# Patient Record
Sex: FEMALE | Race: BLACK OR AFRICAN AMERICAN | Employment: UNEMPLOYED | URBAN - METROPOLITAN AREA
[De-identification: names, ages, dates, MRNs, and addresses within clinical notes are randomized per-mention and may not be internally consistent; named-entity substitution may affect disease eponyms.]

---

## 2018-02-11 ENCOUNTER — HOSPITAL ENCOUNTER (EMERGENCY)
Age: 58
Discharge: HOME | End: 2018-02-11
Attending: EMERGENCY MEDICINE
Payer: MEDICARE

## 2018-02-11 VITALS
HEART RATE: 62 BPM | WEIGHT: 214 LBS | HEIGHT: 71 IN | RESPIRATION RATE: 18 BRPM | SYSTOLIC BLOOD PRESSURE: 180 MMHG | OXYGEN SATURATION: 99 % | DIASTOLIC BLOOD PRESSURE: 84 MMHG | BODY MASS INDEX: 29.96 KG/M2

## 2018-02-11 DIAGNOSIS — I10 ESSENTIAL HYPERTENSION: Primary | ICD-10-CM

## 2018-02-11 PROCEDURE — 99282 EMERGENCY DEPT VISIT SF MDM: CPT

## 2018-02-11 RX ORDER — LOSARTAN POTASSIUM 100 MG/1
100 TABLET ORAL DAILY
COMMUNITY

## 2018-02-11 RX ORDER — FUROSEMIDE 40 MG/1
TABLET ORAL DAILY
COMMUNITY

## 2018-02-11 RX ORDER — ATORVASTATIN CALCIUM 40 MG/1
TABLET, FILM COATED ORAL DAILY
COMMUNITY

## 2018-02-11 RX ORDER — FAMOTIDINE 20 MG/1
20 TABLET, FILM COATED ORAL 2 TIMES DAILY
COMMUNITY

## 2018-02-11 RX ORDER — POTASSIUM CHLORIDE 1.5 G/1.77G
20 POWDER, FOR SOLUTION ORAL 2 TIMES DAILY
COMMUNITY

## 2018-02-11 RX ORDER — LABETALOL 100 MG/1
TABLET, FILM COATED ORAL 2 TIMES DAILY
COMMUNITY

## 2018-02-11 NOTE — ED TRIAGE NOTES
Patient arrives with c/o elevated BP that was taken home. Patient with hx of stroke. No new onset blurry vision, numbness/tingling, HA. Sepsis Screening completed    (  )Patient meets SIRS criteria. ( x )Patient does not meet SIRS criteria.       SIRS Criteria is achieved when two or more of the following are present   Temperature < 96.8°F (36°C) or > 100.9°F (38.3°C)   Heart Rate > 90 beats per minute   Respiratory Rate > 20 breaths per minute   WBC count > 12,000 or <4,000 or > 10% bands

## 2018-02-12 NOTE — DISCHARGE INSTRUCTIONS
High Blood Pressure: Care Instructions  Your Care Instructions    If your blood pressure is usually above 140/90, you have high blood pressure, or hypertension. That means the top number is 140 or higher or the bottom number is 90 or higher, or both. Despite what a lot of people think, high blood pressure usually doesn't cause headaches or make you feel dizzy or lightheaded. It usually has no symptoms. But it does increase your risk for heart attack, stroke, and kidney or eye damage. The higher your blood pressure, the more your risk increases. Your doctor will give you a goal for your blood pressure. Your goal will be based on your health and your age. An example of a goal is to keep your blood pressure below 140/90. Lifestyle changes, such as eating healthy and being active, are always important to help lower blood pressure. You might also take medicine to reach your blood pressure goal.  Follow-up care is a key part of your treatment and safety. Be sure to make and go to all appointments, and call your doctor if you are having problems. It's also a good idea to know your test results and keep a list of the medicines you take. How can you care for yourself at home? Medical treatment  · If you stop taking your medicine, your blood pressure will go back up. You may take one or more types of medicine to lower your blood pressure. Be safe with medicines. Take your medicine exactly as prescribed. Call your doctor if you think you are having a problem with your medicine. · Talk to your doctor before you start taking aspirin every day. Aspirin can help certain people lower their risk of a heart attack or stroke. But taking aspirin isn't right for everyone, because it can cause serious bleeding. · See your doctor regularly. You may need to see the doctor more often at first or until your blood pressure comes down.   · If you are taking blood pressure medicine, talk to your doctor before you take decongestants or anti-inflammatory medicine, such as ibuprofen. Some of these medicines can raise blood pressure. · Learn how to check your blood pressure at home. Lifestyle changes  · Stay at a healthy weight. This is especially important if you put on weight around the waist. Losing even 10 pounds can help you lower your blood pressure. · If your doctor recommends it, get more exercise. Walking is a good choice. Bit by bit, increase the amount you walk every day. Try for at least 30 minutes on most days of the week. You also may want to swim, bike, or do other activities. · Avoid or limit alcohol. Talk to your doctor about whether you can drink any alcohol. · Try to limit how much sodium you eat to less than 2,300 milligrams (mg) a day. Your doctor may ask you to try to eat less than 1,500 mg a day. · Eat plenty of fruits (such as bananas and oranges), vegetables, legumes, whole grains, and low-fat dairy products. · Lower the amount of saturated fat in your diet. Saturated fat is found in animal products such as milk, cheese, and meat. Limiting these foods may help you lose weight and also lower your risk for heart disease. · Do not smoke. Smoking increases your risk for heart attack and stroke. If you need help quitting, talk to your doctor about stop-smoking programs and medicines. These can increase your chances of quitting for good. When should you call for help? Call 911 anytime you think you may need emergency care. This may mean having symptoms that suggest that your blood pressure is causing a serious heart or blood vessel problem. Your blood pressure may be over 180/110. ? For example, call 911 if:  ? · You have symptoms of a heart attack. These may include:  ¨ Chest pain or pressure, or a strange feeling in the chest.  ¨ Sweating. ¨ Shortness of breath. ¨ Nausea or vomiting.   ¨ Pain, pressure, or a strange feeling in the back, neck, jaw, or upper belly or in one or both shoulders or arms.  ¨ Lightheadedness or sudden weakness. ¨ A fast or irregular heartbeat. ? · You have symptoms of a stroke. These may include:  ¨ Sudden numbness, tingling, weakness, or loss of movement in your face, arm, or leg, especially on only one side of your body. ¨ Sudden vision changes. ¨ Sudden trouble speaking. ¨ Sudden confusion or trouble understanding simple statements. ¨ Sudden problems with walking or balance. ¨ A sudden, severe headache that is different from past headaches. ? · You have severe back or belly pain. ?Do not wait until your blood pressure comes down on its own. Get help right away. ?Call your doctor now or seek immediate care if:  ? · Your blood pressure is much higher than normal (such as 180/110 or higher), but you don't have symptoms. ? · You think high blood pressure is causing symptoms, such as:  ¨ Severe headache. ¨ Blurry vision. ? Watch closely for changes in your health, and be sure to contact your doctor if:  ? · Your blood pressure measures 140/90 or higher at least 2 times. That means the top number is 140 or higher or the bottom number is 90 or higher, or both. ? · You think you may be having side effects from your blood pressure medicine. ? · Your blood pressure is usually normal, but it goes above normal at least 2 times. Where can you learn more? Go to http://linda-stefanie.info/. Enter J475 in the search box to learn more about \"High Blood Pressure: Care Instructions. \"  Current as of: September 21, 2016  Content Version: 11.4  © 7494-7133 [employer]. Care instructions adapted under license by [employer] (which disclaims liability or warranty for this information). If you have questions about a medical condition or this instruction, always ask your healthcare professional. [place] [address] any warranty or liability for your use of this information.

## 2018-02-12 NOTE — ED PROVIDER NOTES
EMERGENCY DEPARTMENT HISTORY AND PHYSICAL EXAM    Date: 2/11/2018  Patient Name: Lou Hadley    History of Presenting Illness     Chief Complaint   Patient presents with    Hypertension         History Provided By: Patient and Patient's Daughter    Chief Complaint: Elevated BP  Duration: 3 days  Timing:  Waxing and Waning  Severity: 831'E systolic at home  Associated Symptoms: denies any other associated signs or symptoms    Additional History (Context):   9:05 PM  Lou Hadley is a 62 y.o. 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 Associated sxs vision change which she attributes to sodium in food. Pt had a CVA 12/29/17 and has been home for 3 weeks. Pt reports that she has felt fine since coming home and notes no sxs. Family member reports that visiting nurses were concerned that BP has been high. Denies any hx of DM. Pt denies HA, pain, CP, SOB, abdominal pain, N/V/D/C, urinary sxs, leg swelling or pain, changes in numbness, tobacco use, or any other sxs or complaints. PCP: Estephania Whittaker MD    Current Outpatient Prescriptions   Medication Sig Dispense Refill    atorvastatin (LIPITOR) 40 mg tablet Take  by mouth daily.  famotidine (PEPCID) 20 mg tablet Take 20 mg by mouth two (2) times a day.  furosemide (LASIX) 40 mg tablet Take  by mouth daily.  labetalol (NORMODYNE) 100 mg tablet Take  by mouth two (2) times a day.  losartan (COZAAR) 100 mg tablet Take 100 mg by mouth daily.  potassium chloride (KLOR-CON) 20 mEq packet Take 20 mEq by mouth two (2) times a day.  vortioxetine (TRINTELLIX) 10 mg tablet Take 10 mg by mouth daily.          Past History     Past Medical History:  Past Medical History:   Diagnosis Date    Cancer [facility] eye ([facility] [address]     Hypertension     Stroke [facility])        Past Surgical History:  Past Surgical History: Procedure Laterality Date    ABDOMEN SURGERY PROC UNLISTED      d&c    CARDIOVERSION, ELECTIVE  2016    HX GYN          HX HEENT      cancer    INGRID ECHO COLOR FLOW  2016       Family History:  History reviewed. No pertinent family history. Social History:  Social History   Substance Use Topics    Smoking status: Never Smoker    Smokeless tobacco: Never Used    Alcohol use No       Allergies:  No Known Allergies      Review of Systems   Review of Systems   Constitutional: Negative for chills and fever. HENT: Negative for ear pain and hearing loss. Eyes: Positive for visual disturbance (attributes to sodium in food). Negative for pain. Respiratory: Negative for cough, chest tightness and shortness of breath. Cardiovascular: Negative for chest pain and leg swelling. Gastrointestinal: Negative for abdominal pain, constipation, diarrhea, nausea and vomiting. Genitourinary: Negative for difficulty urinating and dysuria. Musculoskeletal: Negative for arthralgias, back pain, myalgias and neck pain. Neurological: Negative for dizziness, syncope, light-headedness, numbness and headaches. All other systems reviewed and are negative. Physical Exam     Vitals:    18 1817 18 2153   BP: (!) 177/101 180/84   Pulse: 81 62   Resp: 18    SpO2: 98% 99%   Weight: 97.1 kg (214 lb)    Height: 5' 11\" (1.803 m)      Physical Exam   Constitutional: She is oriented to person, place, and time. She appears well-developed. HENT:   Head: Normocephalic and atraumatic. Eyes: Left pupil is round and reactive. Pt is blind in the right eye. Neck: Normal range of motion. Cardiovascular: Normal rate and regular rhythm. Pulmonary/Chest: Effort normal and breath sounds normal. No stridor. She has no decreased breath sounds. She has no wheezes. She has no rhonchi. She has no rales. Abdominal: Soft. She exhibits no distension. There is no tenderness.    Neurological: She is alert and oriented to person, place, and time. No cranial nerve deficit. 4/5 strength of UE right, 5/5 left upper extremity,  pt states that this is her baseline with hx of CVA. Cranial nerves 2-12 grossly intact. Psychiatric: She has a normal mood and affect. Nursing note and vitals reviewed. Diagnostic Study Results     Labs -   No results found for this or any previous visit (from the past 12 hour(s)). Radiologic Studies -    No orders to display     CT Results  (Last 48 hours)    None        CXR Results  (Last 48 hours)    None            Medical Decision Making   I am the first provider for this patient. I reviewed the vital signs, available nursing notes, past medical history, past surgical history, family history and social history. Vital Signs-Reviewed the patient's vital signs. Pulse Oximetry Analysis - 98% on RA     Records Reviewed: Nursing Notes    Provider Notes (Medical Decision Making):   62 y.o. female with recent hx CVA on 12/29/17 coming in for asymptomatic HTN. Pt has no complaints. Has hx of HTN. Will have pt have f/u with PCP tomorrow. Pt is asymptomatic currently given very strict return precautions and to return for CP or other any worsening sxs     Procedures:  Procedures    ED Course:   9:05 PM   Initial assessment performed. The patients presenting problems have been discussed, and they are in agreement with the care plan formulated and outlined with them. I have encouraged them to ask questions as they arise throughout their visit. Diagnosis and Disposition       DISCHARGE NOTE:  10:19 PM  Jacob Robles's  results have been reviewed with her. She has been counseled regarding her diagnosis, treatment, and plan. She verbally conveys understanding and agreement of the signs, symptoms, diagnosis, treatment and prognosis and additionally agrees to follow up as discussed. She also agrees with the care-plan and conveys that all of her questions have been answered.   I have also provided discharge instructions for her that include: educational information regarding their diagnosis and treatment, and list of reasons why they would want to return to the ED prior to their follow-up appointment, should her condition change. She has been provided with education for proper emergency department utilization. CLINICAL IMPRESSION:    1. Essential hypertension        PLAN:  1. D/C Home  2. Current Discharge Medication List        3. Follow-up Information     Follow up With Details Comments [address], [place] Schedule an appointment as soon as possible for a visit in 1 day for PCP follow up [address] [facility] EMERGENCY DEPT  As needed, If symptoms worsen [address] [place] [address]  [phone]        _______________________________    Attestations: This note is prepared by Marii Omer, acting as Scribe for Debby Sexton MD.    Debby Sexton: The scribe's documentation has been prepared under my direction and personally reviewed by me in its entirety.   I confirm that the note above accurately reflects all work, treatment, procedures, and medical decision making performed by me.  _______________________________

## 2018-03-01 ENCOUNTER — HOSPITAL ENCOUNTER (OUTPATIENT)
Dept: PHYSICAL THERAPY | Age: 58
Discharge: HOME | End: 2018-03-01
Payer: MEDICARE

## 2018-03-01 PROCEDURE — G8979 MOBILITY GOAL STATUS: HCPCS | Performed by: PHYSICAL THERAPIST

## 2018-03-01 PROCEDURE — G8984 CARRY CURRENT STATUS: HCPCS

## 2018-03-01 PROCEDURE — 97161 PT EVAL LOW COMPLEX 20 MIN: CPT | Performed by: PHYSICAL THERAPIST

## 2018-03-01 PROCEDURE — 97112 NEUROMUSCULAR REEDUCATION: CPT | Performed by: PHYSICAL THERAPIST

## 2018-03-01 PROCEDURE — 97110 THERAPEUTIC EXERCISES: CPT

## 2018-03-01 PROCEDURE — G8978 MOBILITY CURRENT STATUS: HCPCS | Performed by: PHYSICAL THERAPIST

## 2018-03-01 PROCEDURE — G8985 CARRY GOAL STATUS: HCPCS

## 2018-03-01 PROCEDURE — 97166 OT EVAL MOD COMPLEX 45 MIN: CPT

## 2018-03-01 NOTE — PROGRESS NOTES
PT DAILY TREATMENT NOTE/NEURO EVAL 3-16    Patient Name: Ewelina Gudino  Date:3/1/2018  : 1960  [x]  Patient  Verified  Payor: Benny Darnell / Plan: [address] [place] [address] HMO / Product Type: Managed Care Medicare /    In time: 10:15  Out time:11:00  Total Treatment Time (min): 45  Total Timed Codes (min): 45  1:1 Treatment Time ( [address] only): 39   Visit #: 1 of 12    Treatment Area: CVA    SUBJECTIVE  Pain Level (0-10 scale): 0  []constant []intermittent []improving []worsening []no change since onset    Any medication changes, allergies to medications, adverse drug reactions, diagnosis change, or new procedure performed?: [x] No    [] Yes (see summary sheet for update)  Subjective functional status/changes:     Patient has CC of difficulty walking for 2.5 months. EMORY: CVA L, R hemiplegia. Patient describes pain as sharp in the R elbow and arm. Intermittent, . No diurnal features. Denies numbness/tingling, reports N into R side. . Denies popping/clicking. Aggravating factors:moving the arm. Alleviating factors: unsure. Denies red flags: SOB, chest pain, dizziness/lightheadedness, blurred/double vision, HA, chills/fevers, night sweats, change in bowel/bladder control, abdominal pain, difficulty swallowing, slurred speech, unexplained weight gain/loss. PMHx: hx CA in R eye (), blind in R eye, CVA 2017, HTN, A-fib. Surgical Hx: unremarkable. Social Hx: 2 level home, NO, alcohol, tobacco, work status. PLOF: independent w/ADLs.  CLOF: mod A with ADLs,   Diagnostic Imaging: see epic      OBJECTIVE/EXAMINATION      20: EVAL    20 min Neuromuscular Re-education:  []  See flow sheet :   Rationale: improve coordination, improve balance and increase proprioception  to improve the patients ability to complete ADLs            With   [] TE   [] TA   [] neuro   [] other: Patient Education: [x] Review HEP    [] Progressed/Changed HEP based on:   [] positioning   [] body mechanics   [] transfers   [] heat/ice application    [] other:        Physical Therapy Evaluation - Neurologic    Posture: [] Poor    [x] Fair    [] Good    Describe:       Gait: [] Normal    [x] Abnormal    Device:   Quad cane   Describe: step to with R LE, decrease step length, shortened step length on the L,     ROM:                       PROM WNL, AROM affected by strength deficits     Strength (MMT):             Hip L (1-5) R (1-5)   Hip Flexion 5 3-   Hip Ext 5 3   Hip ABD 5 3-     Knee L (1-5) R (1-5)   Knee Flexion 5 4   Knee Extension 5 4+   Ankle PF 5 3   Ankle DF 5 3       Reflexes: [x] Not Tested    Balance/ Equilibrium:           Sitting Balance: Static:  [x] Good    [] Fair    [] Poor     Dynamic:   [] Good    [x] Fair    [] Poor        Standing Balance: Static:   [] Good    [x] Fair    [] Poor     Dynamic:   [] Good    [] Fair    [x] Poor        Protective Extension:  [] Present    [] Delayed    [] Absent        Single Leg Stance:        Unable to perform     Other:         Impaired Vision:  [x] Y    [] N      Impaired Hearing  [x] Y    [] N      Able to Express Needs [x] Y    [] N    Optional Tests:       Tinetti (28pt scale): 14/28 high falls risk    Other test /comments:  Vitals: /80mmHg  HR: 88bpm       Pain Level (0-10 scale) post treatment: 0    ASSESSMENT/Changes in Function: Patient presents s/p L CVA and R hemiplegia on 12/29/2017. Patient will continue to benefit from skilled PT services to modify and progress therapeutic interventions, address functional mobility deficits, address ROM deficits, address strength deficits, analyze and cue movement patterns, analyze and modify body mechanics/ergonomics, assess and modify postural abnormalities, address imbalance/dizziness and instruct in home and community integration to attain remaining goals.      [x]  See Plan of Care  []  See progress note/recertification  []  See Discharge Summary         Progress towards goals / Updated goals:  See POC    PLAN  []  Upgrade activities as tolerated     [x]  Continue plan of care  []  Update interventions per flow sheet       []  Discharge due to:_  []  Other:_      Darien Burgos PT, DPT 3/1/2018  10:21 AM

## 2018-03-01 NOTE — PROGRESS NOTES
[facility]  [address], [address] [facility] [place]  Ph [phone]  Fx [phone]    Plan of Care/Statement of Necessity for Occupational Therapy Services      Patient name: Kentrell Leal Start of Care: 3/1/2018   Referral source: Max Kohli MD : 1960    Medical Diagnosis: There are no admission diagnoses documented for this encounter. Onset Date:17    Treatment Diagnosis: Right UE weakness   Prior Hospitalization: see medical history Provider#: [id number]   Medications: Verified on Patient summary List    Comorbidities:  Right eye blindness, HBP, Afib, depression   Prior Level of Function: Independent with ADLs and IADLs     The Plan of Care and following information is based on the information from the initial evaluation. Assessment/ key information: Patient is a pleasant 62year old female who presents today s/p Left CVA on 17 resulting in Right Hemiplegia. She reports min-modA with ADLs such as donning/doffing pants, toileting, and bathing UB and LB. Patient has received inpatient rehab at [place] [address] as well as home health therapy through [facility]. She has been completing putty and UE ROM exercises at home. She demonstrates moderate difficulty with digit opposition and fine motor coordination using right hand. She reports stiffness mainly at right elbow and digits and displays moderate pitting edema at dorsum of right hand as well as right foot. Issued isotoner glove today to assist with edema management. Evaluation Complexity: History MEDIUM Complexity : Expanded review of history including physical, cognitive and psychosocial  history  Examination MEDIUM Complexity : 3-5 performance deficits relating to physical, cognitive , or psychosocial skils that result in activity limitations and / or participation restrictions Clinical Decision Making MEDIUM Complexity : Patient may present with comorbidities that affect occupational performnce.  Miniml to moderate modification of tasks or assistance (eg, physical or verbal ) with assesment(s) is necessary to enable patient to complete evaluation   Overall Complexity Rating: MEDIUM    Problem List: Pain effecting function, Decreased range of motion, Decreased strength, Edema effecting function, Decreased coordination/prehension, Decreased ADL/functional abilities , Decreased activity tolerance and Decreased flexibility/joint mobility     Treatment Plan may include any combination of the following: Therapeutic exercise, Therapeutic activities, Neuromuscular re-education, Physical agent/modality, Manual therapy, Splinting/orthoses, Patient education and ADLs/IADLs    Patient / Family readiness to learn indicated by: asking questions and trying to perform skills    Persons(s) to be included in education:   patient (P)    Barriers to Learning/Limitations: None    Patient Goal (s): To get this arm stronger.     Patient Self Reported Health Status: good    Rehabilitation Potential: good    Short Term Goals: To be accomplished in 2  weeks:  Goal:* Patient will be compliant with initial home exercise program to take an active role in their rehabilitation process. Status at eval:   HEP initiated    Long Term Goals: To be accomplished in 4 weeks:  Goal:*Patient will increase  strength by at least 10 lbs at right hand in order to carry a small shopping bag. Status at Eval: 4 lbs     Goal:*Patient will increase pinch strength by at least 3 lbs at right hand in order to manipulate fasteners. Status at Eval: 7 lbs    Goal:*Patient will be able to demonstrate 3/5 MMT and move right UE through full ROM against gravity with no added resistance.   Status at Eval: 2/5 right shoulder flexion and abduction    Frequency / Duration: Patient to be seen 2 times per week for 6 weeks:    Patient/ Caregiver education and instruction: Diagnosis, prognosis, self care, activity modification, brace/ splint application and exercises  [x]  Plan of care has been reviewed with [address]   Current  CL= 60-79%   Goal  CK= 40-59%    The severity rating is based on clinical judgment and the FOTO score. Certification Period: 03/01/18-04/12/18    Mikel Webber OT 3/1/2018 10:50 AM    ________________________________________________________________________    I certify that the above Therapy Services are being furnished while the patient is under my care. I agree with the treatment plan and certify that this therapy is necessary.     Physician's Signature:____________________Date:____________Time:__________    Please sign and return to [facility]  [address], [address] [facility] [address] [place]  Ph [phone]  Fx [phone]

## 2018-03-01 NOTE — PROGRESS NOTES
OT EVAL TREATMENT NOTE - [facility] 4-16    Patient Name: Lizzie Hdez  Date:3/1/2018  : 1960  [x]  Patient  Verified  Payor: Ace Hernandezill / Plan: [address] [place] [address] HMO / Product Type: Managed Care Medicare /    In time:1100  Out time:1150  Total Treatment Time (min): 50  Total Timed Codes (min): 50  1:1 Treatment Time ( [address] only): 50   Visit #: 1 of 12    Treatment Area: There are no admission diagnoses documented for this encounter. SUBJECTIVE  Pain Level (0-10 scale): 210  Any medication changes, allergies to medications, adverse drug reactions, diagnosis change, or new procedure performed?: [x] No    [] Yes (see summary sheet for update)  Subjective functional status/changes:   [x] See paper eval form in chart    OBJECTIVE  30- Eval and training    20 min Therapeutic Exercise: See Flow Sheet    Rationale: increase ROM, increase strength and improve coordination  to improve the patients ability to perform functional ADLs and IADLs. With   [x] TE   [] TA   [] neuro   [] other: Patient Education: [x] Review HEP    [] Progressed/Changed HEP based on:   [x] positioning   [] body mechanics   [] transfers   [x] heat/ice application   [] Splint wear/care   [] Sensory re-education   [] scar management      [] other:         Pain Level (0-10 scale) post treatment: 10    ASSESSMENT/Changes in Function:      [x]  See Plan of Care         Patient Goals:  Short Term Goals: To be accomplished in 2  weeks:  Goal:* Patient will be compliant with initial home exercise program to take an active role in their rehabilitation process. Status at eval:   HEP initiated    Long Term Goals: To be accomplished in 4 weeks:  Goal:*Patient will increase  strength by at least 10 lbs at right hand in order to carry a small shopping bag. Status at Eval: 4 lbs     Goal:*Patient will increase pinch strength by at least 3 lbs at right hand in order to manipulate fasteners.   Status at Eval: 7 lbs    Goal:*Patient will be able to demonstrate 3/5 MMT and move right UE through full ROM against gravity with no added resistance.   Status at Eval: 2/5 right shoulder flexion and abduction    PLAN  [x]  Upgrade activities as tolerated     [x]  Continue plan of care  []  Update interventions per flow sheet       []  Discharge due to:_  []  Other:_      Celso Wolf OT 3/1/2018  10:50 AM

## 2018-03-01 NOTE — PROGRESS NOTES
[facility]  [address] [place], [address] [facility] [address]  Ph [phone]  Fx [phone]    Plan of Care/ Statement of Necessity for Physical Therapy Services    Patient name: Jadiel Maher Start of Care: 3/1/2018   Referral source: Dieudonne Guevara MD : 1960    Medical Diagnosis: CVA   Onset Date:2017    Treatment Diagnosis: functional gait abnormality, R sided weakness   Prior Hospitalization: see medical history Provider#: [id number]   Medications: Verified on Patient summary List    Comorbidities:  hx CA in R eye (), blind in R eye, CVA 2017, HTN, A-fib. Prior Level of Function: independent w/ ADLs      The Plan of Care and following information is based on the information from the initial evaluation. Assessment/ key information: Patient presents s/p L CVA and R hemiplegia on 2017. Patient will continue to benefit from skilled PT services to modify and progress therapeutic interventions, address functional mobility deficits, address ROM deficits, address strength deficits, analyze and cue movement patterns, analyze and modify body mechanics/ergonomics, assess and modify postural abnormalities, address imbalance/dizziness and instruct in home and community integration to attain remaining goals.     Evaluation Complexity History HIGH Complexity :3+ comorbidities / personal factors will impact the outcome/ POC ; Examination HIGH Complexity : 4+ Standardized tests and measures addressing body structure, function, activity limitation and / or participation in recreation  ;Presentation LOW Complexity : Stable, uncomplicated  ;Clinical Decision Making MEDIUM Complexity : FOTO score of 26-74  Overall Complexity Rating: LOW   Problem List: decrease ROM, decrease strength, impaired gait/ balance, decrease ADL/ functional abilitiies, decrease activity tolerance, decrease flexibility/ joint mobility and decrease transfer abilities   Treatment Plan may include any combination of the following: Therapeutic exercise, Therapeutic activities, Neuromuscular re-education, Physical agent/modality, Gait/balance training, Manual therapy, Patient education, Self Care training, Functional mobility training, Home safety training and Stair training  Patient / Family readiness to learn indicated by: asking questions, trying to perform skills and interest  Persons(s) to be included in education: patient (P) and family support person (FSP);list daughter  Barriers to Learning/Limitations: None  Patient Goal (s): To improve walking  Patient Self Reported Health Status: fair  Rehabilitation Potential: good    Short Term Goals: To be accomplished in 2 weeks:   Patient will report compliance with HEP at least 1x/day to aid in rehabilitation program.   Status at IE:NA   Current:     Patient will decrease TUG by 10 seconds to display MCID and progression to decreased falls risk. Status at IE:44.8   Current:       Long Term Goals: To be accomplished in 8 weeks:   Patient will increase strength to 4/5 throughout B LEs to aid in return recreational activities and ADLs. Status at IE: 3/5 throughout several planes in the R LE   Current:     Patient will improve Tinetti score to 19 to demonstrate decreased risk for falls. Status at IE:14   Current:     Patient will ambulate 1000ft on level surface with LRAD. Status at IE: 50ft   Current:     Patient will improve FOTO to TBD points overall to demonstrate improvement in functional ability. Status at IE:TBD   Current:         Frequency / Duration: Patient to be seen 2-3 times per week for 8 weeks. Patient/ Caregiver education and instruction: Diagnosis, prognosis, self care, activity modification and exercises   [x]  Plan of care has been reviewed with PTA    G-Codes (GP)  Mobility   Current  CL= 60-79%   Goal  CK= 40-59%    The severity rating is based on clinical judgment and the FOTO score.     Certification Period: 3/1/2018 - 4/26/2018  Diogenes Farias PT, DPT 3/1/2018 12:05 PM    ________________________________________________________________________    I certify that the above Therapy Services are being furnished while the patient is under my care. I agree with the treatment plan and certify that this therapy is necessary.     Physician's Signature:____________________  Date:____________Time: _________    Please sign and return to [facility]  [address], [address]  Ph [phone]  Fx [phone]

## 2018-03-05 ENCOUNTER — APPOINTMENT (OUTPATIENT)
Dept: PHYSICAL THERAPY | Age: 58
End: 2018-03-05
Payer: MEDICARE

## 2018-03-07 ENCOUNTER — HOSPITAL ENCOUNTER (OUTPATIENT)
Dept: PHYSICAL THERAPY | Age: 58
Discharge: HOME | End: 2018-03-07
Payer: MEDICARE

## 2018-03-07 PROCEDURE — 97116 GAIT TRAINING THERAPY: CPT

## 2018-03-07 PROCEDURE — 97110 THERAPEUTIC EXERCISES: CPT

## 2018-03-07 PROCEDURE — 97112 NEUROMUSCULAR REEDUCATION: CPT

## 2018-03-07 NOTE — PROGRESS NOTES
PT DAILY TREATMENT NOTE     Patient Name: Jr Bravo  Date:3/7/2018  : 1960  [x]  Patient  Verified  Payor: Annabelle  / Plan: [address] [place] [address] HMO / Product Type: Managed Care Medicare /    In time:2:00  Out time:2:40  Total Treatment Time (min): 40  Visit #: 4 of 8    Treatment Area: CVA    SUBJECTIVE  Pain Level (0-10 scale): 0/10  Any medication changes, allergies to medications, adverse drug reactions, diagnosis change, or new procedure performed?: [x] No    [] Yes (see summary sheet for update)  Subjective functional status/changes:   [] No changes reported  \"I just have some difficulty with walking and not looking down. \"    OBJECTIVE      17 min Therapeutic Exercise:  [x] See flow sheet :   Rationale: increase ROM, increase strength and improve coordination to improve the patients ability to perform ADLs with less pain. 13 min Neuromuscular Re-education:  -  See flow sheet : Step height and DF training with ladder and bolsters   Rationale: increase ROM, increase strength and improve coordination  to improve the patients ability to perform ADLs with less pain. 10 min Gait Training:  _100__ feet with __Quad Cane_ device on level surfaces with __CGA_ level of assist               With   [] TE   [] TA   [] neuro   [] other: Patient Education: [x] Review HEP    [] Progressed/Changed HEP based on:   [] positioning   [] body mechanics   [] transfers   [] heat/ice application    [] other:      Other Objective/Functional Measures:      Pain Level (0-10 scale) post treatment: 0/10    ASSESSMENT/Changes in Function:  Pt showed decreased control of step on Left LE. Pt shows good DF when performing step over training. Pt denied modalities post tx.      Patient will continue to benefit from skilled PT services to modify and progress therapeutic interventions, address functional mobility deficits, address ROM deficits, address strength deficits, analyze and address soft tissue restrictions, analyze and cue movement patterns, analyze and modify body mechanics/ergonomics and assess and modify postural abnormalities to attain remaining goals. []  See Plan of Care  []  See progress note/recertification  []  See Discharge Summary         Progress towards goals / Updated goals:  Short Term Goals: To be accomplished in 2 weeks:                        Patient will report compliance with HEP at least 1x/day to aid in rehabilitation program.                        Status at IE:NA                        Current: NT                           Patient will decrease TUG by 10 seconds to display MCID and progression to decreased falls risk. Status at IE:44.8                        Current: NT         Long Term Goals: To be accomplished in 8 weeks:                        Patient will increase strength to 4/5 throughout B LEs to aid in return recreational activities and ADLs. Status at IE: 3/5 throughout several planes in the R LE                        Current: NT                           Patient will improve Tinetti score to 19 to demonstrate decreased risk for falls. Status at IE:14                        Current: NT                           Patient will ambulate 1000ft on level surface with LRAD. Status at IE: 50ft                        Current: NT                           Patient will improve FOTO to TBD points overall to demonstrate improvement in functional ability.                         Status at IE:TBD                        Current: NT        PLAN  [x]  Upgrade activities as tolerated     [x]  Continue plan of care  []  Update interventions per flow sheet       []  Discharge due to:_  []  Other:_      Horacio Davis 3/7/2018  3:21 PM    Future Appointments  Date Time Provider Paul Lorenz   3/8/2018 1:15 PM Kameron Hernandez SLP [facility]   3/8/2018 2:00 PM Viktoria Steven PT [facility] 3/13/2018 12:30 PM Lena Vaughn OT [facility]   3/14/2018 2:30  [address] [facility]   3/16/2018 1:30  [address] [facility]   3/20/2018 1:00 PM Lena Vaughn OT [facility]   3/21/2018 2:30  [address] [facility]   3/23/2018 1:30 PM Julia Bedolla [facility]   3/27/2018 10:00 AM Olivia Laura PT [facility]   3/27/2018 11:30 AM [facility] Vaughn [facility]   3/28/2018 3:00  [address] [facility]   4/3/2018 10:00 AM Lena Vaughn OT [facility]

## 2018-03-08 ENCOUNTER — HOSPITAL ENCOUNTER (OUTPATIENT)
Dept: PHYSICAL THERAPY | Age: 58
Discharge: HOME | End: 2018-03-08
Payer: MEDICARE

## 2018-03-08 PROCEDURE — 97110 THERAPEUTIC EXERCISES: CPT | Performed by: PHYSICAL THERAPIST

## 2018-03-08 PROCEDURE — 92507 TX SP LANG VOICE COMM INDIV: CPT

## 2018-03-08 PROCEDURE — G8999 MOTOR SPEECH CURRENT STATUS: HCPCS

## 2018-03-08 PROCEDURE — 97112 NEUROMUSCULAR REEDUCATION: CPT | Performed by: PHYSICAL THERAPIST

## 2018-03-08 PROCEDURE — G9186 MOTOR SPEECH GOAL STATUS: HCPCS

## 2018-03-08 NOTE — PROGRESS NOTES
PT DAILY TREATMENT NOTE - [facility]     Patient Name: Susan Dimas  Date:3/8/2018  : 1960  [x]  Patient  Verified  Payor: Ne Deal / Plan: [address] [place] [address] HMO / Product Type: Managed Care Medicare /    In time:1413 Out time:1457  Total Treatment Time (min): 44  Total Timed Codes (min): 44    Visit #: 3 of 8    Treatment Area: CVA    SUBJECTIVE  Pain Level (0-10 scale): 0  Any medication changes, allergies to medications, adverse drug reactions, diagnosis change, or new procedure performed?: [x] No    [] Yes (see summary sheet for update)  Subjective functional status/changes:   [x] No changes reported      OBJECTIVE  20 min Neuromuscular Re-education:  [x]  See flow sheet :   Rationale: improve coordination, improve balance and increase proprioception  to improve the patients ability to complete ADLs      24 min Therapeutic Exercise:  [x] See flow sheet :   Rationale: increase ROM, increase strength and improve coordination to improve the patients ability functional mobility             With   [] TE   [] TA   [] neuro   [] other: Patient Education: [x] Review HEP    [] Progressed/Changed HEP based on:   [] positioning   [] body mechanics   [] transfers   [] heat/ice application    [] other:      Other Objective/Functional Measures: gait and balance /control side step , tandem , forward without hand hold , forward with head motions , backward     FOTO 16/100    Pain Level (0-10 scale) post treatment: 0    ASSESSMENT/Changes in Function: difficult forward walking without hand hold decr stride and weight bear on right , pt motivated  With all exercises     Patient will continue to benefit from skilled PT services to modify and progress therapeutic interventions, address functional mobility deficits, address ROM deficits, address strength deficits, analyze and address soft tissue restrictions, analyze and cue movement patterns, analyze and modify body mechanics/ergonomics, assess and modify postural abnormalities and address imbalance/dizziness to attain remaining goals. [x]  See Plan of Care  []  See progress note/recertification  []  See Discharge Summary         Progress towards goals / Updated goals:  Short Term Goals: To be accomplished in 2 weeks:                        Patient will report compliance with HEP at least 1x/day to aid in rehabilitation program.                        Status at IE:NA                        Current: discussed adding ex with HEP                            Patient will decrease TUG by 10 seconds to display MCID and progression to decreased falls risk. Status at IE:44.8                        Current:         Long Term Goals: To be accomplished in 8 weeks:                        Patient will increase strength to 4/5 throughout B LEs to aid in return recreational activities and ADLs. Status at IE: 3/5 throughout several planes in the R LE                        Current:n/t                           Patient will improve Tinetti score to 19 to demonstrate decreased risk for falls. Status at IE:14                        Current:n/t                           Patient will ambulate 1000ft on level surface with LRAD. Status at IE: 50ft                        Current:n/t                           Patient will improve FOTO to TBD points overall to demonstrate improvement in functional ability.                         Status at IE:TBD                        Current:16/100        PLAN  [x]  Upgrade activities as tolerated     [x]  Continue plan of care  []  Update interventions per flow sheet       []  Discharge due to:_  []  Other:_      Madison Lima, PT 3/8/2018  2:13 PM    Future Appointments  Date Time Provider Paul Lorenz   3/13/2018 12:30 PM Margoth Camejo OT [facility]   3/14/2018 2:30  [address] [facility]   3/16/2018 1:30  [address] [facility] 3/20/2018 1:00 PM Josie Escudero OT [facility]   3/21/2018 2:30  [address] [facility]   3/23/2018 1:30  [address] [facility]   3/27/2018 10:00 AM Mel Haley PT [facility]   3/27/2018 11:30 AM Josie Escudero OT [facility]   3/28/2018 3:00  [address] [facility]   4/3/2018 10:00 AM Josie Escudero OT [facility]

## 2018-03-09 NOTE — PROGRESS NOTES
[facility]  [address] [facility]  [facility], [address]  Ph [phone]  Fx [phone]      Plan of Care/ Statement of Necessity for Speech Therapy Services    Patient name: Ramila Hand Start of Care: 3/9/2018   Referral source: Venkatesh Tay MD : 1960    Medical Diagnosis: CVA   Onset Date:2017    Treatment Diagnosis: Dysarthria [I69.322]   Prior Hospitalization: see medical history Provider#: [id number]   Medications: Verified on Patient summary List    Comorbidities: Depression, High Blood Pressure, Visual Impairements   Prior Level of Function: Independent prior to CVA       The Plan of Care and following information is based on the information from the initial evaluation. Assessment/ key information:   Pt is a pleasant 71-year-old female who is s/p L CVA and was was referred for a evaluation due to speech and language concerns. Pt was accompanied by her daughter to the evaluation. Pt reports that her speech is much slower since the stroke and sounds quieter as well. Additionally, she reports difficulty hearing the speech of others. A hearing screening performed recently revealed mild hearing loss in the R ear. Pt was often seen turning her head to the left to hear more efficiently. Pt also reports difficulty with word retrieval during conversational speech and reports that it is frustrating when engaging in conversation with friends and family. Pt engaged in an Oral Mechanism Examination that revealed decreased lingual strength and ROM, especially during lateralization and protrusion downward. Normal velar elevation present, as well as appropriate labial seal and strength. Pt engaged in the Perceptual Dysarthria Evaluation (PDE). Pt's AMRs were slow and irregular and articulatory imprecision was noted during conversational speech. Her vocal quality was breathy in nature with inconsistent pitch variations which she reports are difficult to control.  Speech volume in sentences was judged to be from 53 dB to 64 dB, which is considered to be below normal limits for a woman of her age. Pt also demonstrated difficulty with generative naming tasks, confrontational naming tasks, and comprehension of multi-step directives. It is recommended that the Pt receive skilled speech therapy services as it is medically necessary to improve her vocal tone and naming skills so that she may be able to engage in previously-enjoyed activities. Problem List:      []aphasic  [x]dysarthric  []dysphagic       []alexic  []agraphic  []dysphonia       []dysfluency   []Cognitive-Linguistic Disorder       []other      Treatment Plan may include any combination of the following: Dysarthria Treatment and Patient Education      Patient / Family readiness to learn indicated by: asking questions    Persons(s) to be included in education:   patient (P) and family support person (FSP);list Pt's daughter    Barriers to Learning/Limitations: yes;  emotional and physical    Patient Goal (s): Pt would like her voice to be where it was before CVA. Patient Self Reported Health Status: fair    Rehabilitation Potential: good    Short Term Goals: To be accomplished in 4 weeks   1) Pt will perform 10 diaphragmatic breaths with min A in order to improve overall breath support for phonation. 2) Pt will sustain phonation x15 with min A between the range of 72-75 dB in order to improve the overall strength of her voice in daily conversations. 3) Pt will perform oral motor exercises targeting improved lingual strength and ROM x15 in order to improve tongue strength for speech production. 4) Pt will perform AMRs with no more than 2 cues in order to improve overall speed of speech production.   5) Pt will engage in generative naming tasks with 85% accuracy with min A in order to improve conversational word retrieval.  6) Pt will engage in confrontational naming tasks with 95% accuracy with min A in order to improve conversational word retrieval.  7) Pt will follow multi-step commands with 85% accuracy in order to improve her ability to comprehend lengthy and complex information in her natural environment. Long Term Goals: To be accomplished in 6 weeks   1) Pt will demonstrate improve motor speech characterized by increased vocal volume and improved speed of speech with min A in order to allow the Pt to become a more independent communicator. Frequency / Duration: Patient to be seen 2 times per week for 4 weeks:    Patient/ Caregiver education and instruction: Diagnosis, prognosis, treatment plan, explanation of HEP    G Codes:  [id number] Current  CL= 60-79%   Goal  CK= 40-59%    The severity rating is based on the following outcomes:    National Outcomes Measures (NOMS), Perceptual Dysarthria Evaluation, and [address] [place] [address] Road, SLP 3/8/2018 8:38 AM  ________________________________________________________________________    I certify that the above Therapy Services are being furnished while the patient is under my care. I agree with the treatment plan and certify that this therapy is necessary.     Physician's Signature:____________________  Date:___________Time:_________    Please sign and return to   [facility]  [address]  [place], [address] [place] [address]  Ph [phone]  Fx [phone]       Thank you

## 2018-03-09 NOTE — PROGRESS NOTES
ST DAILY TREATMENT NOTE/ SPEECH EVAL    Patient Name: Alyssa Denney  Date:3/9/2018  : 1960  [x]  Patient  Verified  Payor: Yair Stout / Plan: [address] [place] [address] HMO / Product Type: Managed Care Medicare /   In time:1:15  Out time:2:00  Total Treatment Time (min): 45  Visit #: 1 of 8    SUBJECTIVE  Pain Level (0-10 scale): 0  Any medication changes, allergies to medications, adverse drug reactions, diagnosis change, or new procedure performed?: [] No    [x] Yes (see summary sheet for update; Initial evaluation)  Subjective functional status/changes:   [] No changes reported  Pt shared concerns regarding speed of her speech, speech volume, and word retrieval.  Date of Onset: 2017  Social History:    Prior Functional level:  Independent prior to CVA    OBJECTIVE    OUTPATIENT SPEECH-LANGUAGE EVALUATION    Receptive Language:  Receptive vocabulary    [x] WNL    [] Impaired [] Mild [] Mod [] Severe  Reliability of yes/no responses to questions [x] WNL    [] Impaired [] Mild [] Mod [] Severe Reliability of responses to complex ideation [x] WNL    [] Impaired [] Mild [] Mod [] Severe  Auditory retention/processing   [] WNL    [x] Impaired [] Mild [] Mod [] Severe  Follow commands [] 1 Step [] 2 Step [x] 3 Step [x] complex (Difficult)    Expressive Language  Automatic speech   [x] WNL    [] Impaired [] Mild [] Mod [] Severe  Able to identify objects/pictures  [] WNL    [x] Impaired [x] Mild [] Mod [] Severe  Preservations    [x] WNL    [] Impaired [] Mild [] Mod [] Severe  Word retrieval    [] WNL    [x] Impaired [x] Mild [] Mod [] Severe  Paraphasias   [x]None[] Literal    [] Phenomic   [] Jargon   [] Semantic  Able to make needs known at level [] Word   [x] Phrase   [x] Sentence   [] Gesture        [] Unable    Speech:  Oral Verbal Apraxia: [x] None    [] Mild    [] Moderate    [] Severe   Dysarthria:  [] None    [] Mild    [x] Moderate    [] Severe   Intelligibility:  [] WNL    [] Words   [] Phrases   [] Sentences   [x] Conversation      % Intelligible: 80%  Voice:   [] WNL    [] Hoarse   [x] Breathy   Comments:  Fluency:  [x] WNL    [] Dysfluent:    Patient/Caregiver instruction/education: [x] Review HEP    [] Progressed/Changed    HEP/Handouts given: Explained treatment plan     Pain Level (0-10 scale) post treatment: 0    ASSESSMENT  [x]  See Plan of Care    Short Term Goals: To be accomplished in 4 weeks  1) Pt will perform 10 diaphragmatic breaths with min A in order to improve overall breath support for phonation. 2) Pt will sustain phonation x15 with min A between the range of 72-75 dB in order to improve the overall strength of her voice in daily conversations. 3) Pt will perform oral motor exercises targeting improved lingual strength and ROM x15 in order to improve tongue strength for speech production. 4) Pt will perform AMRs with no more than 2 cues in order to improve overall speed of speech production. 5) Pt will engage in generative naming tasks with 85% accuracy with min A in order to improve conversational word retrieval.  6) Pt will engage in confrontational naming tasks with 95% accuracy with min A in order to improve conversational word retrieval.  7) Pt will follow multi-step commands with 85% accuracy in order to improve her ability to comprehend lengthy and complex information in her natural environment.     Long Term Goals:  To be accomplished in 6 weeks   1) Pt will demonstrate improve motor speech characterized by increased vocal volume and improved speed of speech with min A in order to allow the Pt to become a more independent communicator.                            PLAN  []  Upgrade activities as tolerated     [x]  Continue plan of care  []  Discharge due to:__  [] Other:__    BESSY Hodges 3/9/2018  11:20 AM

## 2018-03-13 ENCOUNTER — HOSPITAL ENCOUNTER (OUTPATIENT)
Dept: PHYSICAL THERAPY | Age: 58
Discharge: HOME | End: 2018-03-13
Payer: MEDICARE

## 2018-03-13 PROCEDURE — 97140 MANUAL THERAPY 1/> REGIONS: CPT

## 2018-03-13 PROCEDURE — 92507 TX SP LANG VOICE COMM INDIV: CPT

## 2018-03-13 PROCEDURE — 97110 THERAPEUTIC EXERCISES: CPT

## 2018-03-13 NOTE — PROGRESS NOTES
ST DAILY TREATMENT NOTE    Patient Name: Kentrell Leal  Date:3/13/2018  : 1960  [x]  Patient  Verified  Payor: Payor: Kiko Lopez / Plan: [address] [place] [address] HMO / Product Type: Managed Care Medicare /   In time:2:00  Out time:2:45  Total Treatment Time (min): 45  Visit #: 2 of 8    Treatment Diagnosis: CVA    SUBJECTIVE  Pain Level (0-10 scale): 0  Any medication changes, allergies to medications, adverse drug reactions, diagnosis change, or new procedure performed?: [x] No    [] Yes (see summary sheet for update)    Subjective functional status/changes:   [] No changes reported  Pt reported that she becomes frustrated when she is unable to recall words during conversation. OBJECTIVE  Treatment provided includes:  Increase/Improve:  []  Voice Quality []  Cognitive Linguistic Skills []  Laryngeal/Pharyngeal Exercises   [x]  Vocal Loudness []  Reading Comprehension []  Swallowing Skills    []  Vocal Cord Function []  Auditory Comprehension [x]  Oral Motor Skills   []  Resonance []  Writing Skills []  Compensatory strategies    []  Speech Intelligibility []  Expressive Language []  Attention   []  Breath Support/Coord.  []  Receptive language []  Memory   []  Articulation []  Safety Awareness []    []  Fluency [x]  Word Retrieval []        Treatment Provided:  Pt performed oral motor exercises targeting lingual strength, speed and ROM x15 with mod cues and decreased speed  Pt produced AMRs with /p,t,k/ with mod cues to maintain a steady pace  Pt engaged in a confrontational naming task (foods) with 100% acc Mitzy  Pt sustained phonation between the range of 66-74 dB with cues to maintain a steady pitch    Patient/Caregiver  Education: [x] Review HEP      HEP/Handouts given: Introduced HEP    Pain Level (0-10 scale) post treatment: 0    ASSESSMENT   []   Improving appropriately and progressing toward goals  [x]   Improving slowly and progressing toward goals  []   Approximating goals/maximum potential  []   Continues to benefit from skilled therapy to address remaining functional deficits  []   Not progressing toward goals and plan of care will be adjusted    Patient will continue to benefit from skilled therapy to address remaining functional deficits: Dysarthria, word retrieval difficulties    Progress towards goals / Updated goals:  Pt demonstrated understanding of prescribed oral motor exercises this session. She presents with decreased speed of lingual movements, but responds well to cues to maintain steady movement. PLAN  [x]  Continue plan of care  []  Modify Goals/Treatment Plan      []  Discharge due to:  [] Other:    Short Term Goals: To be accomplished in 4 weeks   1) Pt will perform 10 diaphragmatic breaths with min A in order to improve overall breath support for phonation. 2) Pt will sustain phonation x15 with min A between the range of 72-75 dB in order to improve the overall strength of her voice in daily conversations. 3/13/18: Pt sustained phonation between the range of 66-74 dB with cues to maintain a steady pitch  3) Pt will perform oral motor exercises targeting improved lingual strength and ROM x15 in order to improve tongue strength for speech production. 3/13/18: Pt performed oral motor exercises targeting lingual strength, speed and ROM x15 with mod cues and decreased speed  4) Pt will perform AMRs with no more than 2 cues in order to improve overall speed of speech production.  3/13/18: Pt produced AMRs with /p,t,k/ with mod cues to maintain a steady pace  5) Pt will engage in generative naming tasks with 85% accuracy with min A in order to improve conversational word retrieval.  6) Pt will engage in confrontational naming tasks with 95% accuracy with min A in order to improve conversational word retrieval. 3/13/18: Pt engaged in a confrontational naming task (foods) with 100% acc Mitzy  7) Pt will follow multi-step commands with 85% accuracy in order to improve her ability to comprehend lengthy and complex information in her natural environment.       BESSY Patiño 3/13/2018  1:59 PM    Future Appointments  Date Time Provider Paul Lorenz   3/13/2018 2:00 PM BESSY Patiño [facility]   3/14/2018 2:30  [address] [facility]   3/16/2018 1:30 PM [facility]   3/20/2018 1:00 PM Josie Escudero, OT [facility]   3/20/2018 2:45 PM BESSY Patiño [facility]   3/21/2018 12:30 PM BESSY Patiño [facility]   [facility]   3/23/2018 1:30  [address] [facility]   3/27/2018 10:00 AM Mel Haley PT [facility]   3/27/2018 11:30 AM Josie Escudero, OT [facility]   3/28/2018 3:00  [address] [facility]   3/29/2018 12:30 PM BESSY Patiño [facility]   3/29/2018 2:45 PM BESSY Patiño [facility]   4/3/2018 10:00 AM Evaline Kena, [id number] [facility] THE [facility]

## 2018-03-13 NOTE — PROGRESS NOTES
OT DAILY TREATMENT NOTE - [facility]     Patient Name: Francisco Justice  Date:3/13/2018  : 1960  [x]  Patient  Verified  Payor: Tatum Abreu / Plan: [address] [place] [address] HMO / Product Type: Managed Care Medicare /    In time:1230  Out time:130  Total Treatment Time (min): 60  Total Timed Codes (min): 60  1:1 Treatment Time ( [address] only): 60   Visit #: 2 of 12    Treatment Area: Nontraumatic intracerebral hemorrhage, unspecified [I61.9]  Other reduced mobility [Z74.09]    SUBJECTIVE   Pain Level (0-10 scale): 2/10 right shoulder  Any medication changes, allergies to medications, adverse drug reactions, diagnosis change, or new procedure performed?: [x] No    [] Yes (see summary sheet for update)  Subjective functional status/changes:   [] No changes reported  \"I try to do the exercises at home you showed me. \"    OBJECTIVE    45 min Therapeutic Exercise:  [x] See flow sheet :   Rationale: increase ROM, increase strength and improve coordination to improve the patients ability to Perform functional ADLs and IADLs. 15 min Manual Therapy:  [x] See flow sheet :   Rationale: increase ROM to improve the patients ability to reach for a glass or item in the cupboard. Dress herself. With   [x] TE   [] TA   [] neuro   [] other: Patient Education: [x] Review HEP    [] Progressed/Changed HEP based on:   [] positioning   [] body mechanics   [] transfers   [] heat/ice application   [] Splint wear/care   [] Sensory re-education   [] scar management      [] other:          Other Objective/Functional Measures: NT today     Pain Level (0-10 scale) post treatment: 2/10    ASSESSMENT/Changes in Function: Patient instructed in AAROM and passive exercises today for home. Use of small ball and towel for ROM exercises. Tolerated manual therapy today pain when greater than 90 degrees of shoulder abduction and flexion.     Patient will continue to benefit from skilled OT services to address ROM deficits, address strength deficits and analyze and address soft tissue restrictions to attain remaining goals. [x]  See Plan of Care  []  See progress note/recertification  []  See Discharge Summary         Progress towards goals / Updated goals:  Short Term Goals: To be accomplished in 2  weeks:  Goal:* Patient will be compliant with initial home exercise program to take an active role in their rehabilitation process. Status at eval:   HEP initiated  Current Status:  Patient continuing HEP     Long Term Goals: To be accomplished in 4 weeks:  Goal:*Patient will increase  strength by at least 10 lbs at right hand in order to carry a small shopping bag. Status at Eval: 4 lbs      Goal:*Patient will increase pinch strength by at least 3 lbs at right hand in order to manipulate fasteners. Status at Eval: 7 lbs     Goal:*Patient will be able to demonstrate 3/5 MMT and move right UE through full ROM against gravity with no added resistance.   Status at Eval: 2/5 right shoulder flexion and abduction    PLAN  [x]  Upgrade activities as tolerated     [x]  Continue plan of care  []  Update interventions per flow sheet       []  Discharge due to:_  []  Other:_      Archana Colvin OT 3/13/2018  1:36 PM

## 2018-03-14 ENCOUNTER — HOSPITAL ENCOUNTER (OUTPATIENT)
Dept: PHYSICAL THERAPY | Age: 58
Discharge: HOME | End: 2018-03-14
Payer: MEDICARE

## 2018-03-14 PROCEDURE — 97110 THERAPEUTIC EXERCISES: CPT

## 2018-03-14 PROCEDURE — 97112 NEUROMUSCULAR REEDUCATION: CPT

## 2018-03-14 PROCEDURE — 97116 GAIT TRAINING THERAPY: CPT

## 2018-03-14 NOTE — PROGRESS NOTES
PT DAILY TREATMENT NOTE     Patient Name: El Watkins  Date:3/14/2018  : 1960  [x]  Patient  Verified  Payor: Morales Valderrama / Plan: [address] [place] [address] HMO / Product Type: Managed Care Medicare /    In time:2:30  Out time:3:16  Total Treatment Time (min): 55  Visit #: 4 of 8    Treatment Area: CVA    SUBJECTIVE  Pain Level (0-10 scale): 0/10  Any medication changes, allergies to medications, adverse drug reactions, diagnosis change, or new procedure performed?: [x] No    [] Yes (see summary sheet for update)  Subjective functional status/changes:   [] No changes reported  \"I don't have any pain today. \"    OBJECTIVE        21 min Therapeutic Exercise:  [x] See flow sheet :   Rationale: increase ROM, increase strength and improve coordination to improve the patients ability to perform ADLs with less pain. 15 min Neuromuscular Re-education:  [x]  See flow sheet : Ladder and bolster drills   Rationale: increase ROM, increase strength and improve coordination  to improve the patients ability to perform ADLs with less pain. 10 min Gait Training:  _150__ feet with _quad cane__ device on level surfaces with _SBA__ level of assist   Rationale: With   [] TE   [] TA   [] neuro   [] other: Patient Education: [x] Review HEP    [] Progressed/Changed HEP based on:   [] positioning   [] body mechanics   [] transfers   [] heat/ice application    [] other:      Other Objective/Functional Measures:      Pain Level (0-10 scale) post treatment: 0/10    ASSESSMENT/Changes in Function: Pt continues to require VC for DF initiation during amb. Pt performed amb sequencing and gait training with quad cane. Pt denied modalities post tx.      Patient will continue to benefit from skilled PT services to modify and progress therapeutic interventions, address functional mobility deficits, address ROM deficits, address strength deficits, analyze and address soft tissue restrictions, analyze and cue movement patterns, analyze and modify body mechanics/ergonomics and assess and modify postural abnormalities to attain remaining goals. []  See Plan of Care  []  See progress note/recertification  []  See Discharge Summary         Progress towards goals / Updated goals:  Short Term Goals: To be accomplished in 2 weeks:                        [facility] compliance with HEP at least 1x/day to aid in rehabilitation program.                        TBBEUQ at IE:NA                        Current: discussed adding ex with HEP                             Patient will decrease TUG by 10 seconds to display MCID and progression to decreased falls risk.                        Status at IE:44.8                        Current: NT          Long Term Goals: To be accomplished in 8 weeks:                        Patient will increase strength to 4/5 throughout B LEs to aid in return recreational activities and ADLs.                       JGXUZB at IE: 3/5 throughout several planes in the R LE                        Current:n/t                            Patient will improve Tinetti score to 19 to demonstrate decreased risk for falls.                        Status at IE:14                        Current:n/t                            Patient will ambulate 1000ft on level surface with LRAD.                       ZGVUPD at IE: 51ft                        Current:n/t                            Patient will improve FOTO to TBD points overall to demonstrate improvement in functional ability.                         SBRXWQ at IE:TBD                        Current:16/100      PLAN  [x]  Upgrade activities as tolerated     [x]  Continue plan of care  []  Update interventions per flow sheet       []  Discharge due to:_  []  Other:_      Roberto Collins 3/14/2018  2:54 PM    Future Appointments  Date Time Provider Paul Lorenz   3/16/2018 1:30 PM Roberto Collins [facility]   3/20/2018 1:00 PM Tawana Fitzgerald, [id number] [facility] 3/20/2018 2:45 PM BESSY Leger [facility]   3/21/2018 12:30 PM BESSY Leger [facility]   3/21/2018 2:30  [address] [facility]   3/23/2018 1:30  [address] [facility]   3/27/2018 10:00 AM Berna Gallegos [facility]   3/27/2018 11:30 AM Sridhar Miranda OT [facility]   3/28/2018 3:00  [address] [facility]   3/29/2018 12:30 PM BESSY Leger [facility]   3/29/2018 2:45 PM BESSY Leger [facility]   4/3/2018 10:00 AM Sridhar Miranda, [id number] [facility]

## 2018-03-16 ENCOUNTER — HOSPITAL ENCOUNTER (OUTPATIENT)
Dept: PHYSICAL THERAPY | Age: 58
Discharge: HOME | End: 2018-03-16
Payer: MEDICARE

## 2018-03-16 PROCEDURE — 97110 THERAPEUTIC EXERCISES: CPT

## 2018-03-16 PROCEDURE — 97530 THERAPEUTIC ACTIVITIES: CPT

## 2018-03-16 NOTE — PROGRESS NOTES
PT DAILY TREATMENT NOTE     Patient Name: Susan Dimas  Date:3/16/2018  : 1960  [x]  Patient  Verified  Payor: Ne Deal / Plan: [address] [place] [address] HMO / Product Type: Managed Care Medicare /    In time:1:34  Out time:2:15  Total Treatment Time (min): 41  Visit #: 5 of 8    Treatment Area: CVA    SUBJECTIVE  Pain Level (0-10 scale): 0/10  Any medication changes, allergies to medications, adverse drug reactions, diagnosis change, or new procedure performed?: [x] No    [] Yes (see summary sheet for update)  Subjective functional status/changes:   [] No changes reported  \"I don't have any pain today. I just woke up from a nap. \"    OBJECTIVE       26 min Therapeutic Exercise:  [x] See flow sheet :   Rationale: increase ROM, increase strength and improve coordination to improve the patients ability to perform ADLs with less pain. 15 min Neuromuscular Re-education:  [x]  See flow sheet :   Rationale: increase ROM, increase strength and improve coordination  to improve the patients ability to perform ADLs with less pain. With   [] TE   [] TA   [] neuro   [] other: Patient Education: [x] Review HEP    [] Progressed/Changed HEP based on:   [] positioning   [] body mechanics   [] transfers   [] heat/ice application    [] other:      Other Objective/Functional Measures:      Pain Level (0-10 scale) post treatment: 0/10    ASSESSMENT/Changes in Function: Pt appeared mildly fatigued pre trx as evidence by pt slowed amb speed based upon established norms, increased in slurring of words and increased eyelid flutter. Pt performed all therex well with no increased pain. Pt very challenged by ladder amb but able to complete single full lap without any DF mistakes.      Patient will continue to benefit from skilled PT services to modify and progress therapeutic interventions, address functional mobility deficits, address ROM deficits, address strength deficits, analyze and address soft tissue restrictions, analyze and cue movement patterns, analyze and modify body mechanics/ergonomics and assess and modify postural abnormalities to attain remaining goals. []  See Plan of Care  []  See progress note/recertification  []  See Discharge Summary         Progress towards goals / Updated goals:  Short Term Goals: To be accomplished in 2 weeks:                        RUXJWGO will report compliance with HEP at least 1x/day to aid in rehabilitation program.                        VBZMOB at IE:NA                        Current: discussed adding ex with HEP                             Patient will decrease TUG by 10 seconds to display MCID and progression to decreased falls risk.                        Status at IE:44.8                        Current: NT          Long Term Goals: To be accomplished in 8 weeks:                        Patient will increase strength to 4/5 throughout B LEs to aid in return recreational activities and ADLs.                       UHRVTZ at IE: 3/5 throughout several planes in the R LE                        Current:n/t                            Patient will improve Tinetti score to 19 to demonstrate decreased risk for falls.                        Status at IE:14                        Current:n/t                            Patient will ambulate 1000ft on level surface with LRAD.                       QWIQNG at IE: 51ft                        Current:n/t                            Patient will improve FOTO to TBD points overall to demonstrate improvement in functional ability.                         CONAAX at IE:TBD                        Current:16/100        PLAN  [x]  Upgrade activities as tolerated     [x]  Continue plan of care  []  Update interventions per flow sheet       []  Discharge due to:_  []  Other:_      Julia Back 3/16/2018  1:37 PM    Future Appointments  Date Time Provider Paul Lorenz   3/20/2018 1:00 PM Lena Vaughn, [id number] [facility]   3/20/2018 2:45 PM Gaila Bundy, SLP [facility] [place] [facility]   3/21/2018 12:30 PM BESSY Chung [facility]   3/21/2018 2:30  [address] [facility]   3/23/2018 1:30  [address] [facility]   [facility]2018 [facility] [place] [facility]   3/27/2018 10:30 AM Derrick Valdivia OT [facility] [place] [facility]   3/28/2018 3:00  [address] [facility] [place] [facility]   3/29/2018 12:30 PM BESSY Chung [facility] [place] [facility]   3/29/2018 2:45 PM BESSY Chung [facility] [place] [facility]   4/3/2018 10:00 AM Derrick Valdivia, [id number] [facility]

## 2018-03-20 ENCOUNTER — HOSPITAL ENCOUNTER (OUTPATIENT)
Dept: PHYSICAL THERAPY | Age: 58
Discharge: HOME | End: 2018-03-20
Payer: MEDICARE

## 2018-03-20 ENCOUNTER — APPOINTMENT (OUTPATIENT)
Dept: PHYSICAL THERAPY | Age: 58
End: 2018-03-20
Payer: MEDICARE

## 2018-03-20 PROCEDURE — 92507 TX SP LANG VOICE COMM INDIV: CPT

## 2018-03-20 NOTE — PROGRESS NOTES
ST DAILY TREATMENT NOTE    Patient Name: El Watkins  Date:3/20/2018  : 1960  [x]  Patient  Verified  Payor: Payor: Morales Valderrama / Plan: [address] [place] [address] HMO / Product Type: Managed Care Medicare /   In time:2:00  Out time:2:45  Total Treatment Time (min): 45  Visit #: 3 of 8    Treatment Diagnosis: CVA    SUBJECTIVE  Pain Level (0-10 scale): 0  Any medication changes, allergies to medications, adverse drug reactions, diagnosis change, or new procedure performed?: [x] No    [] Yes (see summary sheet for update)    Subjective functional status/changes:   [] No changes reported  Pt wrote responses for the HEP; She reported that it was difficult to write with her R hand (due to hemiparesis). OBJECTIVE  Treatment provided includes:  Increase/Improve:  []  Voice Quality []  Cognitive Linguistic Skills []  Laryngeal/Pharyngeal Exercises   [x]  Vocal Loudness []  Reading Comprehension []  Swallowing Skills    []  Vocal Cord Function []  Auditory Comprehension [x]  Oral Motor Skills   []  Resonance []  Writing Skills []  Compensatory strategies    []  Speech Intelligibility []  Expressive Language []  Attention   []  Breath Support/Coord.  []  Receptive language []  Memory   []  Articulation []  Safety Awareness []    []  Fluency [x]  Word Retrieval []        Treatment Provided:  Pt engaged in a generative naming task with 92% acc Mitzy with increased processing time for generation of responses  Pt performed oral motor exercises targeting lingual and labial strength, speed and ROM x15 with mod cues and decreased speed  Pt sustained phonation between the range of 73 and 80 dB with max cues to decrease strain of initial breath     Patient/Caregiver  Education: [x] Review HEP      HEP/Handouts given: Continue HEP; Breathing exercises    Pain Level (0-10 scale) post treatment: 0    ASSESSMENT   []   Improving appropriately and progressing toward goals  [x]   Improving slowly and progressing toward goals  []   Approximating goals/maximum potential  [x]   Continues to benefit from skilled therapy to address remaining functional deficits  []   Not progressing toward goals and plan of care will be adjusted    Patient will continue to benefit from skilled therapy to address remaining functional deficits: Dysarthria    Progress towards goals / Updated goals:  Pt presents with inadequate breath support needed for phonation. She had difficulty utilizing a diaphragmatic breath this session. Tactile cues proved to be helpful. Continue to utilize diaphragmatic breathing to ensure proper breath support for phonation. PLAN  [x]  Continue plan of care  []  Modify Goals/Treatment Plan      []  Discharge due to:  [] Other:    Short Term Goals: To be accomplished in 4 weeks   1) Pt will perform 10 diaphragmatic breaths with min A in order to improve overall breath support for phonation. 2) Pt will sustain phonation x15 with min A between the range of 72-75 dB in order to improve the overall strength of her voice in daily conversations. 3/20/18: Pt sustained phonation between the range of 73-80 dB with max cues to decrease strain of initial breath   3) Pt will perform oral motor exercises targeting improved lingual strength and ROM x15 in order to improve tongue strength for speech production. 3/20/18: Pt performed oral motor exercises targeting lingual and labial strength, speed and ROM x15 with mod cues and decreased speed  4) Pt will perform AMRs with no more than 2 cues in order to improve overall speed of speech production.    5) Pt will engage in generative naming tasks with 85% accuracy with min A in order to improve conversational word retrieval. 3/20/18: Pt engaged in a generative naming task with 92% acc Mitzy with increased processing time for generation of responses  6) Pt will engage in confrontational naming tasks with 95% accuracy with min A in order to improve conversational word retrieval.   7) Pt will follow multi-step commands with 85% accuracy in order to improve her ability to comprehend lengthy and complex information in her natural environment.  [address], SLP 3/20/2018  2:03 PM    Future Appointments  Date Time Provider Paul Schultzi   3/21/2018 12:30 PM BESSY River [facility]   3/21/2018 2:30  [address] [facility]   3/23/2018 1:30  [address] [facility]   3/27/2018 10:00 AM Cassy Credit [facility]   3/27/2018 10:30 AM Demetrius Martínez, OT [facility]   3/28/2018 3:00  [address] [facility]   3/29/2018 12:30 PM BESSY River [facility]   3/29/2018 2:45 PM BESSY River [facility]   4/3/2018 10:00 AM Demetrius Martínez, [id number] [facility]

## 2018-03-21 ENCOUNTER — APPOINTMENT (OUTPATIENT)
Dept: PHYSICAL THERAPY | Age: 58
End: 2018-03-21
Payer: MEDICARE

## 2018-03-22 ENCOUNTER — HOSPITAL ENCOUNTER (OUTPATIENT)
Dept: PHYSICAL THERAPY | Age: 58
Discharge: HOME | End: 2018-03-22
Payer: MEDICARE

## 2018-03-22 PROCEDURE — 97112 NEUROMUSCULAR REEDUCATION: CPT | Performed by: PHYSICAL THERAPIST

## 2018-03-22 PROCEDURE — 92507 TX SP LANG VOICE COMM INDIV: CPT

## 2018-03-22 NOTE — PROGRESS NOTES
ST DAILY TREATMENT NOTE    Patient Name: Willy Victoria  Date:3/22/2018  : 1960  [x]  Patient  Verified  Payor: Payor: Martínez Marcia / Plan: [address] [place] [address] HMO / Product Type: Managed Care Medicare /   In time:2:50  Out time:3:30  Total Treatment Time (min): 40  Visit #: 4 of 8    Treatment Diagnosis: CVA    SUBJECTIVE  Pain Level (0-10 scale): 0  Any medication changes, allergies to medications, adverse drug reactions, diagnosis change, or new procedure performed?: [x] No    [] Yes (see summary sheet for update)    Subjective functional status/changes:   [] No changes reported  Pt reported that she has been compliant with the HEP. OBJECTIVE  Treatment provided includes:  Increase/Improve:  []  Voice Quality []  Cognitive Linguistic Skills []  Laryngeal/Pharyngeal Exercises   []  Vocal Loudness []  Reading Comprehension []  Swallowing Skills    []  Vocal Cord Function []  Auditory Comprehension [x]  Oral Motor Skills   []  Resonance []  Writing Skills []  Compensatory strategies    []  Speech Intelligibility []  Expressive Language []  Attention   [x]  Breath Support/Coord.  []  Receptive language []  Memory   []  Articulation []  Safety Awareness []    []  Fluency [x]  Word Retrieval []        Treatment Provided:  Pt engaged in a responsive naming task with 100% acc Mitzy with increased processing time for generation of responses  Pt performed oral motor exercises targeting lingual and labial strength, speed and ROM x15 with mod cues and improved speed  Pt performed diaphragmatic breaths with max cues to produce a smooth inhale      Patient/Caregiver  Education: [x] Review HEP      HEP/Handouts given: Continue HEP/Reviewed with Pt and Pt's daughter    Pain Level (0-10 scale) post treatment: 0    ASSESSMENT   []   Improving appropriately and progressing toward goals  [x]   Improving slowly and progressing toward goals  []   Approximating goals/maximum potential  [x]   Continues to benefit from skilled therapy to address remaining functional deficits  []   Not progressing toward goals and plan of care will be adjusted    Patient will continue to benefit from skilled therapy to address remaining functional deficits: Dysarthria    Progress towards goals / Updated goals:  Pt continues to exhibit inadequate breath support for phonation. Worked explicitly this session on establishing proper diaphragmatic breath with clavicular involvement. Pt performed better this session. PLAN  [x]  Continue plan of care  []  Modify Goals/Treatment Plan      []  Discharge due to:  [] Other:    Short Term Goals: To be accomplished in 4 weeks   1) Pt will perform 10 diaphragmatic breaths with min A in order to improve overall breath support for phonation. 3/22/18: Pt performed diaphragmatic breaths with max cues to produce a smooth inhale   2) Pt will sustain phonation x15 with min A between the range of 72-75 dB in order to improve the overall strength of her voice in daily conversations.    3) Pt will perform oral motor exercises targeting improved lingual strength and ROM x15 in order to improve tongue strength for speech production. 3/22/18: Pt performed oral motor exercises targeting lingual and labial strength, speed and ROM x15 with mod cues and improved speed  4) Pt will perform AMRs with no more than 2 cues in order to improve overall speed of speech production.    5) Pt will engage in generative naming tasks with 85% accuracy with min A in order to improve conversational word retrieval. 3/22/18: Pt engaged in a responsive naming task with 100% acc Mitzy with increased processing time for generation of responses  6) Pt will engage in confrontational naming tasks with 95% accuracy with min A in order to improve conversational word retrieval.   7) Pt will follow multi-step commands with 85% accuracy in order to improve her ability to comprehend lengthy and complex information in her natural environment.           Orin Fournier BESSY Lock 3/22/2018  2:56 PM    Future Appointments  Date Time Provider Paul Gerda   3/23/2018 1:30 PM Gretchen [facility]   3/27/2018 10:00 AM Gretchen [facility]   3/27/2018 10:30 AM Waynetta Dandy, OT [facility]   3/28/2018 3:00  [address] [facility]   3/29/2018 12:30 PM BESSY Maldonado [facility]   3/29/2018 2:45 PM BESSY Maldonado [facility]   4/3/2018 10:00 AM Waynetta Dandy, [id number] [address] [facility]

## 2018-03-22 NOTE — PROGRESS NOTES
PT DAILY TREATMENT NOTE     Patient Name: Vannesa Ellis  Date:3/22/2018  : 1960  [x]  Patient  Verified  Payor: Brad Figueroa / Plan: [address] [place] [address] HMO / Product Type: Managed Care Medicare /    In time:1:45  Out time:2:12  Total Treatment Time (min): 26  Visit #: 6 of 12    Treatment Area: CVA    SUBJECTIVE  Pain Level (0-10 scale): 0  Any medication changes, allergies to medications, adverse drug reactions, diagnosis change, or new procedure performed?: [x] No    [] Yes (see summary sheet for update)  Subjective functional status/changes:   [] No changes reported  Feels alright. Just a little sore today    OBJECTIVE    27 min Neuromuscular Re-education:  [x]  See flow sheet :   Rationale: increase strength, improve coordination, improve balance and increase proprioception  to improve the patients ability to complete ADLs and decrease falls risk        With   [] TE   [] TA   [] neuro   [] other: Patient Education: [x] Review HEP    [] Progressed/Changed HEP based on:   [] positioning   [] body mechanics   [] transfers   [] heat/ice application    [] other:        Pain Level (0-10 scale) post treatment: 0    ASSESSMENT/Changes in Function: Patient responds well to treatment session. Patient is progressing well. Is challenged by cone taps. Will need to begin progressing to balance activities outside of parallel bars with CGA or Myranda to improve her motor control No adverse effects were noted from today's treatment session      Patient will continue to benefit from skilled PT services to modify and progress therapeutic interventions, address functional mobility deficits, address ROM deficits, address strength deficits, analyze and cue movement patterns, analyze and modify body mechanics/ergonomics, assess and modify postural abnormalities, address imbalance/dizziness and instruct in home and community integration to attain remaining goals.      []  See Plan of Care  []  See progress note/recertification  []  See Discharge Summary         Progress towards goals / Updated goals:  Short Term Goals: To be accomplished in 2 weeks:                        CGSYTJX will report compliance with HEP at least 1x/day to aid in rehabilitation program.                        BZHVKP at IE:NA                        Current: discussed adding ex with HEP                             Patient will decrease TUG by 10 seconds to display MCID and progression to decreased falls risk.                        Status at IE:44.8                        Current: NT          Long Term Goals: To be accomplished in 8 weeks:                        Patient will increase strength to 4/5 throughout B LEs to aid in return recreational activities and ADLs.                       GZHFTF at IE: 3/5 throughout several planes in the R LE                        Current:n/t                            Patient will improve Tinetti score to 19 to demonstrate decreased risk for falls.                        Status at IE:14                        Current:n/t                            Patient will ambulate 1000ft on level surface with LRAD.                       TIZUKH at IE: 51ft                        Current:n/t                            Patient will improve FOTO to 45 points overall to demonstrate improvement in functional ability.                         CFNDHR at IE 16                        Current:16/100    PLAN  []  Upgrade activities as tolerated     [x]  Continue plan of care  []  Update interventions per flow sheet       []  Discharge due to:_  []  Other:_      Selina Edwards, PT, DPT 3/22/2018  1:50 PM    Future Appointments  Date Time Provider Paul Lorenz   3/22/2018 2:45 PM BESSY Lane [facility]   3/23/2018 1:30  [address] [facility]   3/27/2018 10:00 AM Darlene Alvarez [facility]   3/27/2018 10:30 AM Chelsy Moreno OT [facility]   3/28/2018 3:00  [address] [facility]   3/29/2018 12:30 PM Robert Pineda Yeny Pak, [facility]   3/29/2018 2:45 PM Lexus Huston, [facility]   4/3/2018 10:00 AM Celso Wolf, [id number] [facility]

## 2018-03-23 ENCOUNTER — APPOINTMENT (OUTPATIENT)
Dept: PHYSICAL THERAPY | Age: 58
End: 2018-03-23
Payer: MEDICARE

## 2018-03-27 ENCOUNTER — HOSPITAL ENCOUNTER (OUTPATIENT)
Dept: PHYSICAL THERAPY | Age: 58
Discharge: HOME | End: 2018-03-27
Payer: MEDICARE

## 2018-03-27 PROCEDURE — 97110 THERAPEUTIC EXERCISES: CPT

## 2018-03-27 NOTE — PROGRESS NOTES
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 \"    OBJECTIVE    30 min Therapeutic Exercise:  [x] See flow sheet :   Rationale: increase ROM, increase strength and improve coordination to improve the patients ability to perform her functional ADLs and IADLs. With   [x] TE   [] TA   [] neuro   [] other: Patient Education: [x] Review HEP    [] Progressed/Changed HEP based on:   [] positioning   [x] body mechanics   [] transfers   [x] heat/ice application   [] Splint wear/care   [] Sensory re-education   [] scar management      [] other:        Other Objective/Functional Measures: NA     Pain Level (0-10 scale) post treatment: 210    ASSESSMENT/Changes in Function: Patient reeducated on use of heat and stretching to right shoulder for HEP. Continuing to improve speed and accuracy with pegs today. Patient will continue to benefit from skilled OT services to address ROM deficits, address strength deficits and analyze and address soft tissue restrictions to attain remaining goals.      [x]  See Plan of Care  []  See progress note/recertification  []  See Discharge Summary         Progress towards goals / Updated goals:  Short Term Goals: To be accomplished in 2  weeks:  Goal:* Patient will be compliant with initial home exercise program to take an active role in their rehabilitation process. Status at eval:   HEP initiated  Current Status:  Patient continuing HEP MET      Long Term Goals: To be accomplished in 4 weeks:  Goal:*Patient will increase  strength by at least 10 lbs at right hand in order to carry a small shopping bag. Status at Eval: 4 lbs   Current status: 9 lbs       Goal:*Patient will increase pinch strength by at least 3 lbs at right hand in order to manipulate fasteners. Status at Eval: 7 lbs      Goal:*Patient will be able to demonstrate 3/5 MMT and move right UE through full ROM against gravity with no added resistance.   Status at Eval: 2/5 right shoulder flexion and abduction    PLAN  [x]  Upgrade activities as tolerated     [x]  Continue plan of care  []  Update interventions per flow sheet       []  Discharge due to:_  []  Other:_      Imtiaz Casas OT 3/27/2018  10:53 AM

## 2018-03-27 NOTE — PROGRESS NOTES
PT DAILY TREATMENT NOTE     Patient Name: Lizzie Hdez  Date:3/27/2018  : 1960  [x]  Patient  Verified  Payor: Bello Krill / Plan: [address] [place] [address] HMO / Product Type: Managed Care Medicare /    In time:10:02  Out time:10:30  Total Treatment Time (min): 28  Visit #: 7 of 12    Treatment Area: CVA    SUBJECTIVE  Pain Level (0-10 scale): 0/10  Any medication changes, allergies to medications, adverse drug reactions, diagnosis change, or new procedure performed?: [x] No    [] Yes (see summary sheet for update)  Subjective functional status/changes:   [] No changes reported  \"I don't have any pain today. \"    OBJECTIVE      28 min Therapeutic Exercise:  [x] See flow sheet :   Rationale: increase ROM, increase strength and improve coordination to improve the patients ability to perform ADLs with less pain. With   [] TE   [] TA   [] neuro   [] other: Patient Education: [x] Review HEP    [] Progressed/Changed HEP based on:   [] positioning   [] body mechanics   [] transfers   [] heat/ice application    [] other:      Other Objective/Functional Measures:      Pain Level (0-10 scale) post treatment: 0/10    ASSESSMENT/Changes in Function: Pt session shortened due to OT appt conflict time. Pt performed outside of bars balance activities to further challenged by balance. Patient will continue to benefit from skilled PT services to modify and progress therapeutic interventions, address functional mobility deficits, address ROM deficits, address strength deficits, analyze and address soft tissue restrictions, analyze and cue movement patterns, analyze and modify body mechanics/ergonomics and assess and modify postural abnormalities to attain remaining goals.      []  See Plan of Care  []  See progress note/recertification  []  See Discharge Summary         Progress towards goals / Updated goals:  Short Term Goals: To be accomplished in 2 weeks:                        CLEMENT will report compliance with HEP at least 1x/day to aid in rehabilitation program.                        CKUHOZ at [address] [place] [address]: discussed adding ex with HEP                             Patient will decrease TUG by 10 seconds to display MCID and progression to decreased falls risk.                        Status at IE:44.8                        Current: NT          Long Term Goals: To be accomplished in 8 weeks:                        Patient will increase strength to 4/5 throughout B LEs to aid in return recreational activities and ADLs.                       LMFJMK at IE: 3/5 throughout several planes in the R LE                        Current:n/t                            Patient will improve Tinetti score to 19 to demonstrate decreased risk for falls.                        Status at IE:14                        Current:n/t                            Patient will ambulate 1000ft on level surface with LRAD.                       KNNEJC at IE: 51ft                        Current:n/t                            Patient will improve FOTO to 45 points overall to demonstrate improvement in functional ability.                         LHPHHO at IE 16                        Current:16/100    PLAN  [x]  Upgrade activities as tolerated     [x]  Continue plan of care  []  Update interventions per flow sheet       []  Discharge due to:_  []  Other:_      Bonnie He 3/27/2018  10:55 AM    Future Appointments  Date Time Provider Paul Lorenz   3/28/2018 3:00 PM Bonnie He [facility]   3/29/2018 12:30 PM BESSY Headley [facility]   3/29/2018 2:45 PM BESSY Headley [facility]   4/3/2018 10:00 AM Elder [place], [address] [facility]         '

## 2018-03-28 ENCOUNTER — HOSPITAL ENCOUNTER (OUTPATIENT)
Dept: PHYSICAL THERAPY | Age: 58
Discharge: HOME | End: 2018-03-28
Payer: MEDICARE

## 2018-03-28 PROCEDURE — 97112 NEUROMUSCULAR REEDUCATION: CPT | Performed by: PHYSICAL THERAPIST

## 2018-03-28 NOTE — PROGRESS NOTES
PT DAILY TREATMENT NOTE - [facility]     Patient Name: Mauricio Burnette  Date:3/28/2018  : 1960  [x]  Patient  Verified  Payor: Onofre Pemberton / Plan: [address] [place] [address] HMO / Product Type: Managed Care Medicare /    In time:5:00  Out time:5:49  Total Treatment Time (min): 49  Total Timed Codes (min): 49  1:1 Treatment Time ([address] only): 52   Visit #: 8 of 12    Treatment Area: CVA    SUBJECTIVE  Pain Level (0-10 scale): 3  Any medication changes, allergies to medications, adverse drug reactions, diagnosis change, or new procedure performed?: [x] No    [] Yes (see summary sheet for update)  Subjective functional status/changes:   [] No changes reported  Stubbed her toe the other day. Its not bruised, but it anton throbs at times. OBJECTIVE    49 min Neuromuscular Re-education:  []  See flow sheet :   Rationale: increase ROM, increase strength, improve coordination, improve balance and increase proprioception  to improve the patients ability to complete ADLs            With   [] TE   [] TA   [] neuro   [] other: Patient Education: [x] Review HEP    [] Progressed/Changed HEP based on:   [] positioning   [] body mechanics   [] transfers   [] heat/ice application    [] other:      Other Objective/Functional Measures:   amb 540ft with no AD, with AFO and CGA       Pain Level (0-10 scale) post treatment: 0    ASSESSMENT/Changes in Function: Patient responds well to treatment session. Performed obstacle course today, step overs, step ups, tandem on foam wedge, and cone taps. Performed 5 repetitions. Patient is progressing well.  No adverse effects were noted from today's treatment session    Patient will continue to benefit from skilled PT services to modify and progress therapeutic interventions, address functional mobility deficits, address ROM deficits, address strength deficits, analyze and address soft tissue restrictions, analyze and cue movement patterns, analyze and modify body mechanics/ergonomics and assess and modify postural abnormalities to attain remaining goals. []  See Plan of Care  []  See progress note/recertification  []  See Discharge Summary         Progress towards goals / Updated goals:  Short Term Goals: To be accomplished in 2 weeks:                        EVELYNE will report compliance with HEP at least 1x/day to aid in rehabilitation program.                        JDWTYU at IE:NA                        Current: discussed adding ex with HEP                             Patient will decrease TUG by 10 seconds to display MCID and progression to decreased falls risk.                        Status at IE:44.8                        Current: NT          Long Term Goals: To be accomplished in 8 weeks:                        Patient will increase strength to 4/5 throughout B LEs to aid in return recreational activities and ADLs.                       ZCXPAH at IE: 3/5 throughout several planes in the R LE                        Current:n/t                            Patient will improve Tinetti score to 19 to demonstrate decreased risk for falls.                        Status at IE:14                        Current:n/t                            Patient will ambulate 1000ft on level surface with LRAD.                       FFYCYL at IE: 51ft                        Current:540ft with no AD                            Patient will improve FOTO to 45 points overall to demonstrate improvement in functional ability.                         KGFWSC at IE 16                        Current:16/100    PLAN  []  Upgrade activities as tolerated     [x]  Continue plan of care  []  Update interventions per flow sheet       []  Discharge due to:_  []  Other:_      Kristin Oakley, PT, DPT 3/28/2018  5:51 PM    Future Appointments  Date Time Provider Paul Lorenz   3/29/2018 12:30 PM BESSY [facility]   3/29/2018 2:45 PM BESSY [facility]   4/3/2018 10:00 AM Yazmin Hung OT [facility]

## 2018-03-29 ENCOUNTER — HOSPITAL ENCOUNTER (OUTPATIENT)
Dept: PHYSICAL THERAPY | Age: 58
Discharge: HOME | End: 2018-03-29
Payer: MEDICARE

## 2018-03-29 ENCOUNTER — APPOINTMENT (OUTPATIENT)
Dept: PHYSICAL THERAPY | Age: 58
End: 2018-03-29
Payer: MEDICARE

## 2018-03-29 PROCEDURE — 92507 TX SP LANG VOICE COMM INDIV: CPT

## 2018-03-29 NOTE — PROGRESS NOTES
ST DAILY TREATMENT NOTE    Patient Name: Faina Weaver  Date:3/29/2018  : 1960  [x]  Patient  Verified  Payor: Payor: Shaheen Kaur / Plan: [address] [place] [address] HMO / Product Type: Managed Care Medicare /   In time:12:30  Out time:1:15  Total Treatment Time (min): 45  Visit #: 5 of 8    Treatment Diagnosis: CVA    SUBJECTIVE  Pain Level (0-10 scale): 0  Any medication changes, allergies to medications, adverse drug reactions, diagnosis change, or new procedure performed?: [x] No    [] Yes (see summary sheet for update)    Subjective functional status/changes:   [] No changes reported  Pt reported that she feels that her speech is becoming speaker, but is slower when she has to make a conscious effort and think about what she needs to express. OBJECTIVE  Treatment provided includes:  Increase/Improve:  []  Voice Quality []  Cognitive Linguistic Skills []  Laryngeal/Pharyngeal Exercises   []  Vocal Loudness []  Reading Comprehension []  Swallowing Skills    []  Vocal Cord Function []  Auditory Comprehension []  Oral Motor Skills   []  Resonance []  Writing Skills []  Compensatory strategies    [x]  Speech Intelligibility []  Expressive Language []  Attention   []  Breath Support/Coord. []  Receptive language []  Memory   []  Articulation []  Safety Awareness []    []  Fluency []  Word Retrieval []        Treatment Provided:  Pt engaged in unstructured conversation with SLP with improved speed of articulation; Imprecise articulation observed    Patient/Caregiver  Education: [x] Review HEP      HEP/Handouts given: Continue HEP;  Intelligibility strategies    Pain Level (0-10 scale) post treatment: 0    ASSESSMENT   [x]   Improving appropriately and progressing toward goals  []   Improving slowly and progressing toward goals  []   Approximating goals/maximum potential  [x]   Continues to benefit from skilled therapy to address remaining functional deficits  []   Not progressing toward goals and plan of care will be adjusted    Patient will continue to benefit from skilled therapy to address remaining functional deficits: Dysarthria    Progress towards goals / Updated goals:  Pt is making steady progress towards goals. She presented with improved normalcy of speed of speech this session, although articulatory imprecision still observed. Pt also presented with improved word recall during conversation as there were no moments of anomia observed. PLAN  [x]  Continue plan of care  []  Modify Goals/Treatment Plan      []  Discharge due to:  [] Other:    Short Term Goals: To be accomplished in 4 weeks   1) Pt will perform 10 diaphragmatic breaths with min A in order to improve overall breath support for phonation. 2) Pt will sustain phonation x15 with min A between the range of 72-75 dB in order to improve the overall strength of her voice in daily conversations.    3) Pt will perform oral motor exercises targeting improved lingual strength and ROM x15 in order to improve tongue strength for speech production.    4) Pt will perform AMRs with no more than 2 cues in order to improve overall speed of speech production.    5) Pt will engage in generative naming tasks with 85% accuracy with min A in order to improve conversational word retrieval.   6) Pt will engage in confrontational naming tasks with 95% accuracy with min A in order to improve conversational word retrieval.   7) Pt will follow multi-step commands with 85% accuracy in order to improve her ability to comprehend lengthy and complex information in her natural environment.  [address], SLP 3/29/2018  12:36 PM    Future Appointments  Date Time Provider Paul Lorenz   4/3/2018 10:00 AM Dianne Diggs OT [facility]

## 2018-04-03 ENCOUNTER — HOSPITAL ENCOUNTER (OUTPATIENT)
Dept: PHYSICAL THERAPY | Age: 58
Discharge: HOME | End: 2018-04-03
Payer: MEDICARE

## 2018-04-03 PROCEDURE — G8988 SELF CARE GOAL STATUS: HCPCS

## 2018-04-03 PROCEDURE — 97110 THERAPEUTIC EXERCISES: CPT

## 2018-04-03 PROCEDURE — G8987 SELF CARE CURRENT STATUS: HCPCS

## 2018-04-03 NOTE — PROGRESS NOTES
OT DAILY TREATMENT NOTE - [facility]     Patient Name: Amandeep Lopez  Date:4/3/2018  : 1960  [x]  Patient  Verified  Payor: Mattie Simental / Plan: [address] [place] [address] HMO / Product Type: Managed Care Medicare /    In time:1000  Out time:1040  Total Treatment Time (min): 40  Total Timed Codes (min): 40  1:1 Treatment Time ( [address] only): 40   Visit #: 4 of 12    Treatment Area: Nontraumatic intracerebral hemorrhage, unspecified [I61.9]  Other reduced mobility [Z74.09]    SUBJECTIVE  Pain Level (0-10 scale): 0/10  Any medication changes, allergies to medications, adverse drug reactions, diagnosis change, or new procedure performed?: [x] No    [] Yes (see summary sheet for update)  Subjective functional status/changes:   [] No changes reported  \"I just want to get the shoulder up more. \"    OBJECTIVE  40 min Therapeutic Exercise:  [x] See flow sheet :   Rationale: increase ROM, increase strength and improve coordination to improve the patients ability to perform functional ADLs. With   [x] TE   [] TA   [] neuro   [] other: Patient Education: [x] Review HEP    [] Progressed/Changed HEP based on:   [] positioning   [] body mechanics   [] transfers   [] heat/ice application   [] Splint wear/care   [] Sensory re-education   [] scar management      [] other:          Other Objective/Functional Measures: See Progress Note. Pain Level (0-10 scale) post treatment: 0/10    ASSESSMENT/Changes in Function: Patient slowly progressing overall with functional use of Right UE. See progress note for further details. Patient will continue to benefit from skilled OT services to address ROM deficits, address strength deficits and analyze and address soft tissue restrictions to attain remaining goals. []  See Plan of Care  [x]  See progress note/recertification  []  See Discharge Summary         Progress towards goals / Updated goals:  Short Term Goals:  To be accomplished in 2  weeks:  Goal:* Patient will be compliant with initial home exercise program to take an active role in their rehabilitation process. Status at eval:   HEP initiated  Current Status:  Patient continuing HEP MET      Long Term Goals: To be accomplished in 4 weeks:  Goal:*Patient will increase  strength by at least 10 lbs at right hand in order to carry a small shopping bag. Status at Eval: 4 lbs   Current status: 9 lbs progressing       Goal:*Patient will increase pinch strength by at least 3 lbs at right hand in order to manipulate fasteners. Status at Eval: 7 lbs  Current Status: 8 lbs progressing      Goal:*Patient will be able to demonstrate 3/5 MMT and move right UE through full ROM against gravity with no added resistance.   Status at Eval: 2/5 right shoulder flexion and abduction  Current Status: 2+/5 progressing    PLAN  [x]  Upgrade activities as tolerated     [x]  Continue plan of care  []  Update interventions per flow sheet       []  Discharge due to:_  []  Other:_      Dianne Diggs OT 4/3/2018  10:05 AM

## 2018-04-03 NOTE — PROGRESS NOTES
[facility] [address]  [facility], [address]  Ph [phone]  Fx [phone]      Continued Plan of Care/ Re-certification for Occupational Therapy Services    Patient name: Therese Wang Start of Care: 3/1/18   Referral source: Malaika Callahan MD : 1960   Medical/Treatment Diagnosis: Nontraumatic intracerebral hemorrhage, unspecified [I61.9]  Other reduced mobility [Z74.09] Onset Date:17     Prior Hospitalization: see medical history Provider#: [id number]   Medications: Verified on Patient Summary List    Comorbidities: Right Eye blindness, HBP, Afib, Depression  Prior Level of Function:Indep with ADLs and IADLs    Visits from Start of Care: 4    Missed Visits: 0    The Plan of Care and following information is based on the patient's current status:  Short Term Goals: To be accomplished in 2  weeks:  Goal:* Patient will be compliant with initial home exercise program to take an active role in their rehabilitation process. Status at eval:   HEP initiated  Current Status:  Patient continuing HEP MET      Long Term Goals: To be accomplished in 4 weeks:  Goal:*Patient will increase  strength by at least 10 lbs at right hand in order to carry a small shopping bag. Status at Eval: 4 lbs   Current status: 9 lbs progressing       Goal:*Patient will increase pinch strength by at least 3 lbs at right hand in order to manipulate fasteners. Status at Eval: 7 lbs  Current Status: 8 lbs progressing      Goal:*Patient will be able to demonstrate 3/5 MMT and move right UE through full ROM against gravity with no added resistance. Status at Eval: 2/5 right shoulder flexion and abduction  Current Status: 2+/5 progressing    Key functional changes: Patient has shown slow improvements in UE ROM at right shoulder. She is now able to hold small items in hand such as a bingo marker and toothbrush.   She can also now get a fork to her mouth using her right hand which she could not do before. Patient is showing improved speed and coordination with her right hand picking up small objects as well. Problems/ barriers to goal attainment: none     Treatment Plan: Therapeutic exercise, Therapeutic activities, Neuromuscular re-education, Patient education and ADLs/IADLs      Patient Goal (s) has been updated and includes: To get stronger and move more.      Goals for this certification period to be accomplished in 4 weeks:  Continue with unmet goals. Frequency / Duration: Patient to be seen 2 times per week for 4 weeks:    Assessment / Recommendations: Continue OT.    G-Codes (GO)  Self Care   Current  CK= 40-59%  [id number] Goal  CJ= 20-39%    The severity rating is based on clinical judgment and the FOTO Score score. Certification Period: 04/03/18-05/1/18    Davy Claire OT 4/3/2018 12:23 PM    ________________________________________________________________________  I certify that the above Therapy Services are being furnished while the patient is under my care. I agree with the treatment plan and certify that this therapy is necessary.       Physician's Signature:_________________ Date:___________Time:__________      Please sign and return to [facility]  [address], [address] [facility]  Ph [phone]  Fx [phone]

## 2018-04-06 ENCOUNTER — HOSPITAL ENCOUNTER (OUTPATIENT)
Dept: PHYSICAL THERAPY | Age: 58
Discharge: HOME | End: 2018-04-06
Payer: MEDICARE

## 2018-04-06 PROCEDURE — 97110 THERAPEUTIC EXERCISES: CPT

## 2018-04-06 PROCEDURE — 97530 THERAPEUTIC ACTIVITIES: CPT

## 2018-04-06 NOTE — PROGRESS NOTES
PT DAILY TREATMENT NOTE - [facility]     Patient Name: Nya López  Date:2018  : 1960  [x]  Patient  Verified  Payor: Jerilynn Canavan / Plan: [address] [place] [address] HMO / Product Type: Managed Care Medicare /    In time:11:38  Out time:12:20  Total Treatment Time (min): 42  Total Timed Codes (min): 42  1:1 Treatment Time ( only): 42   Visit #: 9 of 12    Treatment Area: CVA    SUBJECTIVE  Pain Level (0-10 scale): 10  Any medication changes, allergies to medications, adverse drug reactions, diagnosis change, or new procedure performed?: [x] No    [] Yes (see summary sheet for update)  Subjective functional status/changes:   [] No changes reported  \"I have pain in my left ankle for some reason today. \"    OBJECTIVE      30 min Therapeutic Exercise:  [x] See flow sheet :   Rationale: increase ROM, increase strength and improve coordination to improve the patients ability to return to prior level of physical activity. 12 min Therapeutic Activity:  [x]  See flow sheet :   Rationale: increase ROM, increase strength and improve coordination  to improve the patients ability to return to prior level of physical activity. With   [] TE   [] TA   [] neuro   [] other: Patient Education: [x] Review HEP    [] Progressed/Changed HEP based on:   [] positioning   [] body mechanics   [] transfers   [] heat/ice application    [] other:      Other Objective/Functional Measures:      Pain Level (0-10 scale) post treatment: 2/10    ASSESSMENT/Changes in Function: Pt continues to be challenged by therex. Pt requires VC for increased stride length during amb. Pt reports fear of placing SL support onto R LE at this time.      Patient will continue to benefit from skilled PT services to modify and progress therapeutic interventions, address functional mobility deficits, address ROM deficits, address strength deficits, analyze and address soft tissue restrictions, analyze and cue movement patterns, analyze and modify body mechanics/ergonomics and assess and modify postural abnormalities to attain remaining goals. []  See Plan of Care  []  See progress note/recertification  []  See Discharge Summary         Progress towards goals / Updated goals:  Short Term Goals: To be accomplished in 2 weeks:                        IBNMVUL will report compliance with HEP at least 1x/day to aid in rehabilitation program.                        WGSWZY at IE:NA                        Current: discussed adding ex with HEP                             Patient will decrease TUG by 10 seconds to display MCID and progression to decreased falls risk.                        Status at IE:44.8                        Current: NT          Long Term Goals: To be accomplished in 8 weeks:                        Patient will increase strength to 4/5 throughout B LEs to aid in return recreational activities and ADLs.                       QJVKKZ at IE: 3/5 throughout several planes in the R LE                        Current:n/t                            Patient will improve Tinetti score to 19 to demonstrate decreased risk for falls.                        Status at IE:14                        Current:n/t                            Patient will ambulate 1000ft on level surface with LRAD.                       URAEVF at IE: 51ft                        Current:540ft with no AD                            Patient will improve FOTO to 45 points overall to demonstrate improvement in functional ability.                         DOFFPW at IE 16                        Current:16/100       PLAN  [x]  Upgrade activities as tolerated     [x]  Continue plan of care  []  Update interventions per flow sheet       []  Discharge due to:_  []  Other:_      Cale [place] 4/6/2018  11:55 AM    Future Appointments  Date Time Provider Paul Lorenz   4/10/2018 11:30 AM Mitali Malave OT [facility]   4/10/2018 12:30 PM Arminda Perkins, SLP [facility]   4/12/2018 11:00 AM BESSY Prasad [facility]   4/12/2018 12:00  [address] [facility]   4/12/2018 1:00 PM Ra Frank OT [facility]   4/17/2018 10:30 AM Lundy Severin, PT [facility]   4/17/2018 11:00 AM Ra Frank OT [facility]   4/19/2018 12:30 PM Acosta Kassy [facility]   4/19/2018 1:30 PM Ra Frank, [id number] [facility]

## 2018-04-12 ENCOUNTER — HOSPITAL ENCOUNTER (OUTPATIENT)
Dept: PHYSICAL THERAPY | Age: 58
Discharge: HOME | End: 2018-04-12
Payer: MEDICARE

## 2018-04-12 PROCEDURE — 92507 TX SP LANG VOICE COMM INDIV: CPT

## 2018-04-12 PROCEDURE — 97110 THERAPEUTIC EXERCISES: CPT

## 2018-04-12 PROCEDURE — 97112 NEUROMUSCULAR REEDUCATION: CPT

## 2018-04-12 PROCEDURE — G9186 MOTOR SPEECH GOAL STATUS: HCPCS

## 2018-04-12 PROCEDURE — G8999 MOTOR SPEECH CURRENT STATUS: HCPCS

## 2018-04-12 NOTE — PROGRESS NOTES
[facility]  [address], [address] [facility] [place]  Ph [phone]  Fx [phone]    Continued Plan of Care/ Re-certification for Speech Therapy Services    Patient name: Nav Junior Start of Care: 3/9/2018   Referral source: Jose A Conte MD : 1960   Medical/Treatment Diagnosis: CVA; Dysarthria Onset Date:2017     Prior Hospitalization: see medical history Provider#: [id number]   Medications: Verified on Patient Summary List    Comorbidities: Depression, High Blood Pressure, Visual Impairments  Prior Level of Function:Independent prior to CVA  Visits from Start of Care: 6    Missed Visits: 3    The Plan of Care and following information is based on the patient's current status:  Goal: Pt will perform 10 diaphragmatic breaths with min A in order to improve overall breath support for phonation. Status at evaluation: Staggered, labored breaths  Current Status: GOAL MET; No cues needed to use 10 smooth, diaphragmatic breaths    Goal: Pt will sustain phonation x15 with min A between the range of 72-75 dB in order to improve the overall strength of her voice in daily conversations. Status at evaluation: 53-64 dB  Current Status: GOAL MET; 74-86 dB    Goal:Pt will perform oral motor exercises targeting improved lingual strength and ROM x15 in order to improve tongue strength for speech production. Status at evaluation: Weak, imprecise articulation caused by decreased lingual and labial strength  Current Status: Progressing with cues from SLP to perform exercises with greater precision and speed    Goal: Pt will perform AMRs with no more than 2 cues in order to improve overall speed of speech production. Status at last note/certification: Slow and irregular AMRs  Current Status: Progressing; Slow and irregular;  Imprecise production    Goal: Pt will engage in generative naming tasks with 85% accuracy with min A in order to improve conversational word retrieval.  Status at last note/certification: Increased processing time  Current Status: GOAL MET; 100%    Goal: Pt will engage in confrontational naming tasks with 95% accuracy with min A in order to improve conversational word retrieval.  Status at last note/certification: Minimal cueing and increased processing speed  Current Status: GOAL MET; 100% acc; mild processing speed noted    Goal: Pt will follow multi-step commands with 85% accuracy in order to improve her ability to comprehend lengthy and complex information in her natural environment. Status at last note/certification: Not introduced  Current Status: Goal discontinued; Not meaningful to Pt as she said she comprehends, but has a difficult time hearing    Key functional changes: Pt has demonstrated improvements in her ability to sustain phonation with improved vocal intensity. Additionally, Pt has demonstrated the ability to produce smooth, diaphragmatic breaths at rest with fading cues. Pt has demonstrated improvements with all naming goals, although she continues to present with mild processing difficulties. Problems/ barriers to goal attainment: Physical, emotional     Problem List:      []aphasic  [x]dysarthric  []dysphagic       []alexic  []agraphic  []dysphonia       []dysfluency  []Cognitive-Linguistic Disorder       []other     Treatment Plan: Oral Motor Therapeutic Excerise, Dysarthria Treatment and Patient Education    Patient Goal (s) has been updated and includes: Dysarthria treatment; Intelligibility goals;      Goals for this certification period to be accomplished in 6 weeks:  1) Pt will perform oral motor exercises targeting improved lingual strength and ROM x15 in order to improve tongue strength for speech production. 2) Pt will perform AMRs with no more than 2 cues in order to improve overall speed of speech production.   3) Pt will engage in CV motor speech drills with varying consonants with no more than 2 cues for speed and precision of production from SLP. 4) Pt will read sentence-level material within the range of 70-73 dB in order to improve overall phonation/respiration coordination during lengthier utterances. 5) Pt will engage in sentence-level intelligibility drills with no more than 2 cues from SLP for improved articulatory precision and speed and pitch maintenance. Frequency / Duration: Patient to be seen 2 times per week for 6 weeks:    Assessment/Recommendations: It is recommended that Pt continue to receive skilled speech and language therapy to improve deficits related to Dysarthria. Treatment program will include continued work with oral strengthening exercises, vocal volume, and speech precision and intelligibility. G Codes: Motor:  Current  CK= 40-59%   Goal  CJ= 20-39%      The severity rating is based on the following outcomes:    National Outcomes Measures (NOMS) and Professional Judgement    Certification Period: 4/12/2018 to 5/10/2018    BESSY Dominguez 4/12/2018 9:56 AM    _____________________________________________________________________    I certify that the above Therapy Services are being furnished while the patient is under my care. I agree with the treatment plan and certify that this therapy is necessary. []  I have read the above report and request that my patient continue as recommended. []  I have read the above report and request that my patient continue therapy with the following changes/special instructions:________________________________________  []I have read the above report and request that my patient be discharged from therapy.     Physician's Signature:_________________ Date:___________Time:__________      Please sign and return to   [facility] [address] [place], [address] [facility] [address]        Ph [phone]  Fx [phone]

## 2018-04-12 NOTE — PROGRESS NOTES
ST DAILY TREATMENT NOTE    Patient Name: John Gabriel  Date:2018  : 1960  [x]  Patient  Verified  Payor: Payor: Gabriel Eisenmenger / Plan: [address] [place] [address] HMO / Product Type: Managed Care Medicare /   In time:10:00  Out time:10:45  Total Treatment Time (min): 45  Visit #: 1 of 12    Treatment Diagnosis: CVA    SUBJECTIVE  Pain Level (0-10 scale): 0  Any medication changes, allergies to medications, adverse drug reactions, diagnosis change, or new procedure performed?: [] No    [] Yes (see summary sheet for update)    Subjective functional status/changes:   [] No changes reported  Pt reported that she feels her speech is, \"getting a little better,\" since starting speech therapy. OBJECTIVE  Treatment provided includes:  Increase/Improve:  [x]  Voice Quality []  Cognitive Linguistic Skills []  Laryngeal/Pharyngeal Exercises   []  Vocal Loudness []  Reading Comprehension []  Swallowing Skills    []  Vocal Cord Function []  Auditory Comprehension [x]  Oral Motor Skills   []  Resonance []  Writing Skills []  Compensatory strategies    []  Speech Intelligibility []  Expressive Language []  Attention   [x]  Breath Support/Coord.  []  Receptive language []  Memory   []  Articulation []  Safety Awareness []    []  Fluency []  Word Retrieval []        Treatment Provided:  Pt completed lingual and labial strengthening exercises x10-15 with min cues  Pt sustained phonation x15 between 74-86 dB    Patient/Caregiver  Education: [x] Review HEP      HEP/Handouts given: Continue HEP    Pain Level (0-10 scale) post treatment: 0    ASSESSMENT   []   Improving appropriately and progressing toward goals  [x]   Improving slowly and progressing toward goals  []   Approximating goals/maximum potential  [x]   Continues to benefit from skilled therapy to address remaining functional deficits  []   Not progressing toward goals and plan of care will be adjusted    Patient will continue to benefit from skilled therapy to address remaining functional deficits: Dysarthria (Flaccid)    Progress towards goals / Updated goals:  Pt has demonstrated improvements in her ability to sustain phonation with improved vocal intensity. Additionally, Pt has demonstrated the ability to produce smooth, diaphragmatic breaths at rest with fading cues. Pt has demonstrated improvements with all naming goals, although she continues to present with mild processing difficulties. It is recommended that Pt continue to receive skilled speech and language therapy to improve deficits related to Dysarthria. Treatment program will include continued work with oral strengthening exercises, vocal volume, and speech precision and intelligibility.     PLAN  [x]  Continue plan of care  []  Modify Goals/Treatment Plan      []  Discharge due to:  [] Other:    Goals for this certification period to be accomplished in 6 weeks:  1) Pt will perform oral motor exercises targeting improved lingual strength and ROM x15 in order to improve tongue strength for speech production. 2) Pt will perform AMRs with no more than 2 cues in order to improve overall speed of speech production. 3) Pt will engage in CV motor speech drills with varying consonants with no more than 2 cues for speed and precision of production from SLP. 4) Pt will read sentence-level material within the range of 70-73 dB in order to improve overall phonation/respiration coordination during lengthier utterances.   5) Pt will engage in sentence-level intelligibility drills with no more than 2 cues from SLP for improved articulatory precision and speed and pitch maintenance.     BESSY Pandey 4/12/2018  12:46 PM    Future Appointments  Date Time Provider Paul Lorenz   4/17/2018 10:30 AM Renan Mercer PT [facility]   4/17/2018 11:00 AM Demetrice Nicole OT [facility]   4/19/2018 12:30 PM [place]   4/19/2018 1:30 PM Demetrice Nicole, [id number] [facility]

## 2018-04-12 NOTE — PROGRESS NOTES
OT DAILY TREATMENT NOTE - [facility]     Patient Name: Vincent Esquivel  Date:2018  : 1960  [x]  Patient  Verified  Payor: Fara Huber / Plan: [address] [place] [address] HMO / Product Type: Managed Care Medicare /    In time:1100  Out time:1130  Total Treatment Time (min): 30  Total Timed Codes (min): 30  1:1 Treatment Time ( [address] only): 30   Visit #: 5 of 12    Treatment Area: Nontraumatic intracerebral hemorrhage, unspecified [I61.9]  Other reduced mobility [Z74.09]    SUBJECTIVE  Pain Level (0-10 scale): 0/10   Any medication changes, allergies to medications, adverse drug reactions, diagnosis change, or new procedure performed?: [x] No    [] Yes (see summary sheet for update)  Subjective functional status/changes:   [] No changes reported  \"I have really been working the arm and doing the towel exercises you showed me. \"    OBJECTIVE    30 min Therapeutic Exercise:  [x] See flow sheet :   Rationale: increase ROM, increase strength and improve coordination to improve the patients ability to perform ADLs and IADLs. With   [x] TE   [] TA   [] neuro   [] other: Patient Education: [x] Review HEP    [] Progressed/Changed HEP based on:   [] positioning   [] body mechanics   [] transfers   [] heat/ice application   [] Splint wear/care   [] Sensory re-education   [] scar management      [] other:          Other Objective/Functional Measures: NT     Pain Level (0-10 scale) post treatment: 0/10    ASSESSMENT/Changes in Function: Patient able to color 2 small pictures with minimal difficulty today using R hand demonstrating good FM coordination. She still reports pain with shoulder ROM greater than 90 degrees. Patient will continue to benefit from skilled OT services to address ROM deficits, address strength deficits and analyze and address soft tissue restrictions to attain remaining goals.      [x]  See Plan of Care  []  See progress note/recertification  []  See Discharge Summary         Progress towards goals / Updated goals:  Short Term Goals: To be accomplished in 2  weeks:  Goal:* Patient will be compliant with initial home exercise program to take an active role in their rehabilitation process. Status at eval:   HEP initiated  Current Status:  Patient continuing HEP MET      Long Term Goals: To be accomplished in 4 weeks:  Goal:*Patient will increase  strength by at least 10 lbs at right hand in order to carry a small shopping bag. Status at Eval: 4 lbs   Current status: 9 lbs progressing       Goal:*Patient will increase pinch strength by at least 3 lbs at right hand in order to manipulate fasteners. Status at Eval: 7 lbs  Current Status: 8 lbs progressing      Goal:*Patient will be able to demonstrate 3/5 MMT and move right UE through full ROM against gravity with no added resistance.   Status at Eval: 2/5 right shoulder flexion and abduction  Current Status: 2+/5 progressing    PLAN  [x]  Upgrade activities as tolerated     [x]  Continue plan of care  []  Update interventions per flow sheet       []  Discharge due to:_  []  Other:_      Abbie Castellanos OT 4/12/2018  11:35 AM

## 2018-04-12 NOTE — PROGRESS NOTES
PT DAILY TREATMENT NOTE - [facility]     Patient Name: Amandeep Lopez  Date:2018  : 1960  [x]  Patient  Verified  Payor: Mattie Simental / Plan: [address] [place] [address] HMO / Product Type: Managed Care Medicare /    In time:11:33  Out time:12:13  Total Treatment Time (min): 40  Total Timed Codes (min): 40  1:1 Treatment Time ( [address] only): 40   Visit #: 10 of 12    Treatment Area: CVA    SUBJECTIVE  Pain Level (0-10 scale): 0/10  Any medication changes, allergies to medications, adverse drug reactions, diagnosis change, or new procedure performed?: [x] No    [] Yes (see summary sheet for update)  Subjective functional status/changes:   [] No changes reported  \"I don't really have any pain today. OBJECTIVE    25 min Therapeutic Exercise:  [x] See flow sheet :   Rationale: increase ROM, increase strength and improve coordination to improve the patients ability to return to prior level of physical activity. 15 min Neuromuscular Re-education:  -  See flow sheet :   Rationale: increase ROM, increase strength, improve coordination, improve balance and increase proprioception  to improve the patients ability to return to prior level of physical activity. With   [] TE   [] TA   [] neuro   [] other: Patient Education: [x] Review HEP    [] Progressed/Changed HEP based on:   [] positioning   [] body mechanics   [] transfers   [] heat/ice application    [] other:      Other Objective/Functional Measures:      Pain Level (0-10 scale) post treatment: 0/10    ASSESSMENT/Changes in Function: Pt shows improvement during amb without AD, able to clear heal for strides. Pt reported increased fatigue post tx.      Patient will continue to benefit from skilled PT services to modify and progress therapeutic interventions, address functional mobility deficits, address ROM deficits, address strength deficits, analyze and address soft tissue restrictions, analyze and cue movement patterns, analyze and modify body mechanics/ergonomics and assess and modify postural abnormalities to attain remaining goals. []  See Plan of Care  []  See progress note/recertification  []  See Discharge Summary         Progress towards goals / Updated goals:  Short Term Goals: To be accomplished in 2 weeks:                        EIRICDD will report compliance with HEP at least 1x/day to aid in rehabilitation program.                        QJKBVP at IE:NA                        Current: discussed adding ex with HEP                             Patient will decrease TUG by 10 seconds to display MCID and progression to decreased falls risk.                        Status at IE:44.8                        Current: NT          Long Term Goals: To be accomplished in 8 weeks:                        Patient will increase strength to 4/5 throughout B LEs to aid in return recreational activities and ADLs.                       DMIJTD at IE: 3/5 throughout several planes in the R LE                        Current:n/t                            Patient will improve Tinetti score to 19 to demonstrate decreased risk for falls.                        Status at IE:14                        Current:n/t                            Patient will ambulate 1000ft on level surface with LRAD.                       LAUUPL at IE: 51ft                        Current:540ft with no AD                            Patient will improve FOTO to 45 points overall to demonstrate improvement in functional ability.                         CDJGQA at IE 16                        Current:16/100       PLAN  [x]  Upgrade activities as tolerated     [x]  Continue plan of care  []  Update interventions per flow sheet       []  Discharge due to:_  []  Other:_      Jorge Ocasio 4/12/2018  11:37 AM    Future Appointments  Date Time Provider Paul Lorenz   4/17/2018 10:30 AM Anuel Solis PT [facility]   4/17/2018 11:00 AM Theodore Alberts OT [facility]   4/19/2018 12:30 PM Ethyl [place] [facility]   4/19/2018 1:30 PM Janine Grande, [address] [facility]

## 2018-04-17 ENCOUNTER — HOSPITAL ENCOUNTER (OUTPATIENT)
Dept: PHYSICAL THERAPY | Age: 58
Discharge: HOME | End: 2018-04-17
Payer: MEDICARE

## 2018-04-17 PROCEDURE — 97112 NEUROMUSCULAR REEDUCATION: CPT | Performed by: PHYSICAL THERAPIST

## 2018-04-17 PROCEDURE — 97110 THERAPEUTIC EXERCISES: CPT

## 2018-04-17 NOTE — PROGRESS NOTES
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 \"    OBJECTIVE    30 min Therapeutic Exercise:  [x] See flow sheet :   Rationale: increase ROM, increase strength and improve coordination to improve the patients ability to perform ADLs. With   [x] TE   [] TA   [] neuro   [] other: Patient Education: [x] Review HEP    [] Progressed/Changed HEP based on:   [] positioning   [] body mechanics   [] transfers   [] heat/ice application   [] Splint wear/care   [] Sensory re-education   [] scar management      [] other:         Other Objective/Functional Measures: NT     Pain Level (0-10 scale) post treatment: 0    ASSESSMENT/Changes in Function: Patient tolerated session well today. Patient able to perform 2x10 shoulder flexion with 1 rest break today. Progressing well overall. Patient will continue to benefit from skilled OT services to address ROM deficits, address strength deficits and analyze and address soft tissue restrictions to attain remaining goals. [x]  See Plan of Care  []  See progress note/recertification  []  See Discharge Summary         Progress towards goals / Updated goals:  Short Term Goals:  To be accomplished in 2  weeks:  Goal:* Patient will be compliant with initial home exercise program to take an active role in their rehabilitation process. Status at eval:   HEP initiated  Current Status:  Patient continuing HEP MET      Long Term Goals: To be accomplished in 4 weeks:  Goal:*Patient will increase  strength by at least 10 lbs at right hand in order to carry a small shopping bag. Status at Eval: 4 lbs   Current status: 9 lbs progressing       Goal:*Patient will increase pinch strength by at least 3 lbs at right hand in order to manipulate fasteners. Status at Eval: 7 lbs  Current Status: 8 lbs progressing      Goal:*Patient will be able to demonstrate 3/5 MMT and move right UE through full ROM against gravity with no added resistance.   Status at Eval: 2/5 right shoulder flexion and abduction  Current Status: 2+/5 progressing     PLAN  [x]  Upgrade activities as tolerated     [x]  Continue plan of care  []  Update interventions per flow sheet       []  Discharge due to:_  []  Other:_      Silvina Malcolm OT 4/17/2018  11:02 AM

## 2018-04-17 NOTE — PROGRESS NOTES
PT DAILY TREATMENT NOTE - [facility]     Patient Name: Vincent Esquivel  Date:2018  : 1960  [x]  Patient  Verified  Payor: Fara Huber / Plan: [address] [place] [address] HMO / Product Type: Managed Care Medicare /    In time:10:35  Out time:10:59  Total Treatment Time (min): 24  Total Timed Codes (min): 24  1:1 Treatment Time ( [address] only): 24   Visit #: 11 of 12    Treatment Area: CVA    SUBJECTIVE  Pain Level (0-10 scale): 0  Any medication changes, allergies to medications, adverse drug reactions, diagnosis change, or new procedure performed?: [x] No    [] Yes (see summary sheet for update)  Subjective functional status/changes:   [] No changes reported  Feels good. No new issues. OBJECTIVE    24 min Neuromuscular Re-education:  [x]  See flow sheet :   Rationale: improve coordination, improve balance and increase proprioception  to improve the patients ability to complete ADLs        With   [] TE   [] TA   [] neuro   [] other: Patient Education: [x] Review HEP    [] Progressed/Changed HEP based on:   [] positioning   [] body mechanics   [] transfers   [] heat/ice application    [] other:        Pain Level (0-10 scale) post treatment: 0    ASSESSMENT/Changes in Function: Patient responds well to treatment session. Is challenged by obstacle negotiation (blue foam wedges). No adverse effects were noted from today's treatment session      Patient will continue to benefit from skilled PT services to modify and progress therapeutic interventions, address functional mobility deficits, address ROM deficits, address strength deficits, analyze and cue movement patterns, analyze and modify body mechanics/ergonomics, assess and modify postural abnormalities, address imbalance/dizziness and instruct in home and community integration to attain remaining goals.      []  See Plan of Care  []  See progress note/recertification  []  See Discharge Summary         Progress towards goals / Updated goals:  Short Term Goals: To be accomplished in 2 weeks:                        Patient will report compliance with HEP at least 1x/day to aid in rehabilitation program.                        CCHPMS at IE:NA                        Current: discussed adding ex with HEP                             Patient will decrease TUG by 10 seconds to display MCID and progression to decreased falls risk.                        Status at IE:44.8                        Current: NT          Long Term Goals: To be accomplished in 8 weeks:                        Patient will increase strength to 4/5 throughout B LEs to aid in return recreational activities and ADLs.                       OEPJZV at IE: 3/5 throughout several planes in the R LE                        Current:n/t                            Patient will improve Tinetti score to 19 to demonstrate decreased risk for falls.                        Status at IE:14                        Current:n/t                            Patient will ambulate 1000ft on level surface with LRAD.                       OYIGSO at IE: 51ft                        Current:540ft with no AD                            Patient will improve FOTO to 45 points overall to demonstrate improvement in functional ability.                         HWXCDD at IE 16                        Current:16/100    PLAN  []  Upgrade activities as tolerated     [x]  Continue plan of care  []  Update interventions per flow sheet       []  Discharge due to:_  []  Other:_      Patsy Caldera PT, DPT 4/17/2018  10:35 AM    Future Appointments  Date Time Provider Paul Lorenz   4/17/2018 11:00 AM Breanna Randolph OT [facility]   4/19/2018 12:30  [address] [facility]   4/19/2018 1:30 PM Breanna Randolph [address] [facility]

## 2018-04-18 ENCOUNTER — HOSPITAL ENCOUNTER (OUTPATIENT)
Dept: PHYSICAL THERAPY | Age: 58
Discharge: HOME | End: 2018-04-18
Payer: MEDICARE

## 2018-04-18 PROCEDURE — 92507 TX SP LANG VOICE COMM INDIV: CPT

## 2018-04-18 NOTE — PROGRESS NOTES
ST DAILY TREATMENT NOTE    Patient Name: Vishal Sweeney  Date:2018  : 1960  [x]  Patient  Verified  Payor: Payor: Maicol [place] / Plan: [address] [place] [address] HMO / Product Type: Managed Care Medicare /   In time:12:00  Out time:12:45  Total Treatment Time (min): 45  Visit #: 2 of 12    Treatment Diagnosis: CVA    SUBJECTIVE  Pain Level (0-10 scale): 0  Any medication changes, allergies to medications, adverse drug reactions, diagnosis change, or new procedure performed?: [x] No    [] Yes (see summary sheet for update)    Subjective functional status/changes:   [x] No changes reported      OBJECTIVE  Treatment provided includes:  Increase/Improve:  [x]  Voice Quality []  Cognitive Linguistic Skills []  Laryngeal/Pharyngeal Exercises   [x]  Vocal Loudness []  Reading Comprehension []  Swallowing Skills    []  Vocal Cord Function []  Auditory Comprehension [x]  Oral Motor Skills   []  Resonance []  Writing Skills []  Compensatory strategies    [x]  Speech Intelligibility []  Expressive Language []  Attention   [x]  Breath Support/Coord.  []  Receptive language []  Memory   []  Articulation []  Safety Awareness []    []  Fluency []  Word Retrieval []        Treatment Provided:  Pt performed oral motor exercises targeting lingual and labial strength, speed and ROM x15 with mod cues and improved speed  Pt required 3 cues when producing CV combinations for speed and clarity of productions (especially with alveolar initial phonemes)  Pt produced sentences between the range of 57-80 dB     Patient/Caregiver  Education: [x] Review HEP      HEP/Handouts given: Continue HEP    Pain Level (0-10 scale) post treatment: 0    ASSESSMENT   []   Improving appropriately and progressing toward goals  [x]   Improving slowly and progressing toward goals  []   Approximating goals/maximum potential  [x]   Continues to benefit from skilled therapy to address remaining functional deficits  []   Not progressing toward goals and plan of care will be adjusted    Patient will continue to benefit from skilled therapy to address remaining functional deficits: Dysarthria    Progress towards goals / Updated goals:  Pt demonstrated the ability to perform oral motor exercises with improved independence. She exhibited increased sped during tongue exercises. PLAN  [x]  Continue plan of care  []  Modify Goals/Treatment Plan      []  Discharge due to:  [] Other:    Goals for this certification period to be accomplished in 6 weeks:  1) Pt will perform oral motor exercises targeting improved lingual strength and ROM x15 in order to improve tongue strength for speech production. 4/18/18: Pt performed oral motor exercises targeting lingual and labial strength, speed and ROM x15 with mod cues and improved speed  2) Pt will perform AMRs with no more than 2 cues in order to improve overall speed of speech production. 3) Pt will engage in CV motor speech drills with varying consonants with no more than 2 cues for speed and precision of production from SLP. 4/18/18: 3 cues  4) Pt will read sentence-level material within the range of 70-73 dB in order to improve overall phonation/respiration coordination during lengthier utterances. 4/18/18: 57-70 dB  5) Pt will engage in sentence-level intelligibility drills with no more than 2 cues from SLP for improved articulatory precision and speed and pitch maintenance.     BESSY Prasad 4/18/2018  12:38 PM    Future Appointments  Date Time Provider Department Center   4/19/2018 1:30 PM Ra Frank OT [facility]   4/19/2018 4:30  [address] [facility]   4/20/2018 9:45 AM BESSY Prasad [facility]   4/24/2018 1:30 PM Ra Frank OT [facility]   4/26/2018 11:00 AM Ra Frank OT [facility]   5/3/2018 11:00 AM Berry Kassy [facility]   5/10/2018 1:00 PM Lundy Severin, SID [facility]   5/10/2018 2:00 PM Ra Frank OT [facility]

## 2018-04-19 ENCOUNTER — HOSPITAL ENCOUNTER (OUTPATIENT)
Dept: PHYSICAL THERAPY | Age: 58
Discharge: HOME | End: 2018-04-19
Payer: MEDICARE

## 2018-04-19 ENCOUNTER — APPOINTMENT (OUTPATIENT)
Dept: PHYSICAL THERAPY | Age: 58
End: 2018-04-19
Payer: MEDICARE

## 2018-04-19 PROCEDURE — 97112 NEUROMUSCULAR REEDUCATION: CPT

## 2018-04-19 NOTE — PROGRESS NOTES
PT DAILY TREATMENT NOTE - [facility]     Patient Name: Fiona Stinson  Date:2018  : 1960  [x]  Patient  Verified  Payor: Lo Liu / Plan: [address] [place] [address] HMO / Product Type: Managed Care Medicare /    In time:3:58  Out time:4:28  Total Treatment Time (min): 30  Total Timed Codes (min): 30  1:1 Treatment Time ( [address] only): 30   Visit #: 12 of 12    Treatment Area: CVA    SUBJECTIVE  Pain Level (0-10 scale): 0/10  Any medication changes, allergies to medications, adverse drug reactions, diagnosis change, or new procedure performed?: [x] No    [] Yes (see summary sheet for update)  Subjective functional status/changes:   [] No changes reported  \"Oh lord what you got for me today. \"    OBJECTIVE      30 min Neuromuscular Re-education:  [x]  See flow sheet :   Rationale: increase ROM, increase strength and improve coordination  to improve the patients ability to return to prior level of physical activity. With   [] TE   [] TA   [] neuro   [] other: Patient Education: [x] Review HEP    [] Progressed/Changed HEP based on:   [] positioning   [] body mechanics   [] transfers   [] heat/ice application    [] other:      Other Objective/Functional Measures:      Pain Level (0-10 scale) post treatment: 0/10    ASSESSMENT/Changes in Function: Pt continues to be challenged by Neuro Re-ed ex. Pt was greatly challenged and required CGA with amb on bolster for balance improvement. Pt denied pain or modalities post tx. Patient will continue to benefit from skilled PT services to modify and progress therapeutic interventions, address functional mobility deficits, address ROM deficits, address strength deficits, analyze and address soft tissue restrictions, analyze and cue movement patterns, analyze and modify body mechanics/ergonomics and assess and modify postural abnormalities to attain remaining goals.      []  See Plan of Care  []  See progress note/recertification  []  See Discharge Summary Progress towards goals / Updated goals:  Short Term Goals: To be accomplished in 2 weeks:                        TFYRJKE will report compliance with HEP at least 1x/day to aid in rehabilitation program.                        CPTNIX at IE:NA                        Current: discussed adding ex with HEP                             Patient will decrease TUG by 10 seconds to display MCID and progression to decreased falls risk.                        Status at IE:44.8                        Current: NT          Long Term Goals: To be accomplished in 8 weeks:                        Patient will increase strength to 4/5 throughout B LEs to aid in return recreational activities and ADLs.                       JYVLGT at IE: 3/5 throughout several planes in the R LE                        Current:n/t                            Patient will improve Tinetti score to 19 to demonstrate decreased risk for falls.                        Status at IE:14                        Current:n/t                            Patient will ambulate 1000ft on level surface with LRAD.                       GBXKNV at IE: 51ft                        Current:540ft with no AD                            Patient will improve FOTO to 45 points overall to demonstrate improvement in functional ability.                         JTIQPW at IE 16                        Current:16/100    PLAN  [x]  Upgrade activities as tolerated     [x]  Continue plan of care  []  Update interventions per flow sheet       []  Discharge due to:_  []  Other:_      Hira Shelton 4/19/2018  4:03 PM    Future Appointments  Date Time Provider Paul Lorenz   4/20/2018 9:45 AM Nelly Sanchez, SLP [facility]   4/24/2018 11:00 AM Dima Gallo PT, DPT [facility]   4/24/2018 1:30 PM Marcia Cotter OT [facility]   4/26/2018 11:00 AM Marcia Cotter OT [facility]   4/26/2018 12:30  [address] [facility]   5/3/2018 11:00  [address] [facility]   5/10/2018 1:00 PM Winnie Tariq, PT, DPT [facility]   5/10/2018 2:00 PM Edilberto Zaldivar OT [facility]

## 2018-04-20 ENCOUNTER — HOSPITAL ENCOUNTER (OUTPATIENT)
Dept: PHYSICAL THERAPY | Age: 58
Discharge: HOME | End: 2018-04-20
Payer: MEDICARE

## 2018-04-20 PROCEDURE — 92507 TX SP LANG VOICE COMM INDIV: CPT

## 2018-04-20 NOTE — PROGRESS NOTES
ST DAILY TREATMENT NOTE    Patient Name: Ventura Hearn  Date:2018  : 1960  [x]  Patient  Verified  Payor: Payor: Karina Rivas / Plan: [address] [place] [address] HMO / Product Type: Managed Care Medicare /   In time:9:50  Out time:10:30  Total Treatment Time (min): 40  Visit #: 3 of 12    Treatment Diagnosis: CVA    SUBJECTIVE  Pain Level (0-10 scale): 0  Any medication changes, allergies to medications, adverse drug reactions, diagnosis change, or new procedure performed?: [x] No    [] Yes (see summary sheet for update)    Subjective functional status/changes:   [x] No changes reported    OBJECTIVE  Treatment provided includes:  Increase/Improve:  []  Voice Quality []  Cognitive Linguistic Skills []  Laryngeal/Pharyngeal Exercises   []  Vocal Loudness []  Reading Comprehension []  Swallowing Skills    []  Vocal Cord Function []  Auditory Comprehension [x]  Oral Motor Skills   []  Resonance []  Writing Skills []  Compensatory strategies    [x]  Speech Intelligibility []  Expressive Language []  Attention   [x]  Breath Support/Coord.  []  Receptive language []  Memory   []  Articulation []  Safety Awareness []    []  Fluency []  Word Retrieval []        Treatment Provided:  Pt performed oral motor exercises targeting lingual and labial strength, speed and ROM x15 with mod cues and improved speed  Pt required 2 cues when producing CV combinations for speed and clarity of productions (especially with alveolar initial phonemes)  Pt produced sentences between the range of 65-73 dB  Pt engaged in a sentence-level intelligibility drill with mod cue for over-articulation consonant sounds    Patient/Caregiver  Education: [x] Review HEP      HEP/Handouts given: Continue HEP    Pain Level (0-10 scale) post treatment: 0    ASSESSMENT   []   Improving appropriately and progressing toward goals  []   Improving slowly and progressing toward goals  []   Approximating goals/maximum potential  []   Continues to benefit from skilled therapy to address remaining functional deficits  []   Not progressing toward goals and plan of care will be adjusted    Patient will continue to benefit from skilled therapy to address remaining functional deficits: Dysarthria    Progress towards goals / Updated goals:  Pt demonstrated improved vocal intensity during sentences this session. Needs continued work on overall speech intelligibility in sentences and conversation. PLAN  [x]  Continue plan of care  []  Modify Goals/Treatment Plan      []  Discharge due to:  [] Other:    Goals for this certification period to be accomplished in 6 weeks:  1) Pt will perform oral motor exercises targeting improved lingual strength and ROM x15 in order to improve tongue strength for speech production. 4/20/18: Pt performed oral motor exercises targeting lingual and labial strength, speed and ROM x15 with mod cues and improved speed  2) Pt will perform AMRs with no more than 2 cues in order to improve overall speed of speech production. 3) Pt will engage in CV motor speech drills with varying consonants with no more than 2 cues for speed and precision of production from SLP. 4/20/18: 2 cues  4) Pt will read sentence-level material within the range of 70-73 dB in order to improve overall phonation/respiration coordination during lengthier utterances. 4/20/18: Pt produced sentences between the range of 65-73 dB   5) Pt will engage in sentence-level intelligibility drills with no more than 2 cues from SLP for improved articulatory precision and speed and pitch maintenance.  4/20/18: Pt engaged in a sentence-level intelligibility drill with mod cue for over-articulation consonant sounds       BESSY Ji 4/20/2018  9:45 AM    Future Appointments  Date Time Provider Paul Lorenz   4/24/2018 11:00 AM Nader Ferrera, PT, DPT [facility]   4/24/2018 1:30 PM Abbie Castellanos OT [facility]   4/26/2018 11:00 AM Abbie Castellanos OT [facility]   4/26/2018 12:30 PM [facility]   5/3/2018 11:00 AM [facility]   5/10/2018 1:00 PM Patsy Caldera, PT, DPT [facility]   5/10/2018 2:00 PM Breanna Randolph, [id number] [facility]

## 2018-04-24 ENCOUNTER — HOSPITAL ENCOUNTER (OUTPATIENT)
Dept: PHYSICAL THERAPY | Age: 58
Discharge: HOME | End: 2018-04-24
Payer: MEDICARE

## 2018-04-24 ENCOUNTER — APPOINTMENT (OUTPATIENT)
Dept: PHYSICAL THERAPY | Age: 58
End: 2018-04-24
Payer: MEDICARE

## 2018-04-24 PROCEDURE — 97530 THERAPEUTIC ACTIVITIES: CPT | Performed by: PHYSICAL THERAPIST

## 2018-04-24 PROCEDURE — G8978 MOBILITY CURRENT STATUS: HCPCS | Performed by: PHYSICAL THERAPIST

## 2018-04-24 PROCEDURE — 97112 NEUROMUSCULAR REEDUCATION: CPT | Performed by: PHYSICAL THERAPIST

## 2018-04-24 PROCEDURE — G8979 MOBILITY GOAL STATUS: HCPCS | Performed by: PHYSICAL THERAPIST

## 2018-04-24 NOTE — PROGRESS NOTES
[facility]  [address], [address] [facility]  Ph [phone]  Fx [phone]    Continued Plan of Care/ Re-certification for Physical Therapy Services    Patient name: Daija Luciano Start of Care: 3/1/2018   Referral source: Leticia Jauregui MD : 1960   Medical/Treatment Diagnosis: CVA Onset Date:2018     Prior Hospitalization: see medical history Provider#: [id number]   Medications: Verified on Patient Summary List    Comorbidities: hx CA in R eye (), blind in R eye, CVA 2017, HTN, A-fib. Prior Level of Function:independent w/ ADLs    Visits from Start of Care: 12    Missed Visits: 2    The Plan of Care and following information is based on the patient's current status:      Key functional changes: decreased falls risk, improved balance, increased strength      Problems/ barriers to goal attainment: NA     Problem List: decrease strength, edema affecting function, impaired gait/ balance, decrease ADL/ functional abilitiies, decrease activity tolerance, decrease flexibility/ joint mobility and decrease transfer abilities    Treatment Plan: Therapeutic exercise, Therapeutic activities, Neuromuscular re-education, Physical agent/modality, Gait/balance training, Patient education, Self Care training, Functional mobility training, Home safety training and Stair training       Goals for this certification period to be accomplished in 3 weeks:    Long Term Goals: To be accomplished in 3 weeks:                        Patient will increase strength to 4/5 throughout B LEs to aid in return recreational activities and ADLs.                       QPZMBE at IE: 3/5 throughout several planes in the R LE                        Current:4/5 in knee flex/ext, 3/5 in R hip flex progressing                            Patient will improve Tinetti score to 24 to demonstrate decreased risk for falls.  (UPDATED)                        QBBILR at IE:14                        Current: 23                            Patient will ambulate 1000ft on level surface with no AD. (UPDATED)                        ANOIHU at IE: 50ft                        Current:1000ft with quad cane                             Patient will improve FOTO to 45 points overall to demonstrate improvement in functional ability.                       KHQFAH at IE 16                        Current:45/100 Met 4/24/2018    Frequency / Duration: Patient to be seen 3 times per week for 3 weeks:    Assessment / Recommendations:Patient responds well to treatment session. Patient has made good progress to date. Meeting most goals. However, still presents as a moderate falls risk and has continue weakness in R LE. Will continue to benefit from skilled PT services to address these deficits. Patient will continue to benefit from skilled PT services to modify and progress therapeutic interventions, address functional mobility deficits, address ROM deficits, address strength deficits, analyze and cue movement patterns, analyze and modify body mechanics/ergonomics, assess and modify postural abnormalities, address imbalance/dizziness and instruct in home and community integration to attain remaining goals. G-Codes (GP)  Mobility  [id number] Current  CK= 40-59%  [id number] Goal  CK= 40-59%    The severity rating is based on clinical judgment and the FOTO score. Certification Period: 4/24/2018 - 5/15/2018    Kristin Oakley PT, DPT 4/24/2018 12:49 PM    ________________________________________________________________________  I certify that the above Therapy Services are being furnished while the patient is under my care. I agree with the treatment plan and certify that this therapy is necessary. [] I have read the above and request that my patient continue as recommended.   [] I have read the above report and request that my patient continue therapy with the following changes/special instructions: _______________________________________  [] I have read the above report and request that my patient be discharged from therapy    Physician's Signature:________________________________Date:___________Time:__________    Please sign and return to [facility]  [address], [address]  Ph [phone]  Fx [phone]

## 2018-04-24 NOTE — PROGRESS NOTES
PT DAILY TREATMENT NOTE - [facility]     Patient Name: Jung Armstrong  Date:2018  : 1960  [x]  Patient  Verified  Payor: Ashley Rhoades / Plan: [address] [place] [address] HMO / Product Type: Managed Care Medicare /    In time:11:05  Out time:1150  Total Treatment Time (min): 45  Total Timed Codes (min): 45  1:1 Treatment Time ( [address] only): 39   Visit #:  24    Treatment Area: CVA    SUBJECTIVE  Pain Level (0-10 scale): 3  Any medication changes, allergies to medications, adverse drug reactions, diagnosis change, or new procedure performed?: [x] No    [] Yes (see summary sheet for update)  Subjective functional status/changes:   [] No changes reported  Feels alright. The R LE is achy and painful today. Not sure if its the weather. OBJECTIVE    30 min Therapeutic Activity:  [x]  See flow sheet :   Rationale: increase ROM, increase strength and improve coordination  to improve the patients ability to complete ADLs     15 min Neuromuscular Re-education:  [x]  See flow sheet :   Rationale: improve coordination, improve balance and increase proprioception  to improve the patients ability to complete ADLs and decrease falls risk        With   [] TE   [] TA   [] neuro   [] other: Patient Education: [x] Review HEP    [] Progressed/Changed HEP based on:   [] positioning   [] body mechanics   [] transfers   [] heat/ice application    [] other:      Other Objective/Functional Measures: TU.8sec  Tinetti: 23/28 (moderate falls risk)     Pain Level (0-10 scale) post treatment: 0    ASSESSMENT/Changes in Function: Patient responds well to treatment session. Patient has made good progress to date. Meeting most goals. However, still presents as a moderate falls risk and has continue weakness in R LE. Will continue to benefit from skilled PT services to address these deficits.  No adverse effects were noted from today's treatment session      Patient will continue to benefit from skilled PT services to modify and progress therapeutic interventions, address functional mobility deficits, address ROM deficits, address strength deficits, analyze and cue movement patterns, analyze and modify body mechanics/ergonomics, assess and modify postural abnormalities, address imbalance/dizziness and instruct in home and community integration to attain remaining goals. []  See Plan of Care  [x]  See progress note/recertification  []  See Discharge Summary         Progress towards goals / Updated goals:  Short Term Goals: To be accomplished in 2 weeks:                        QXCLTTD will report compliance with HEP at least 1x/day to aid in rehabilitation program.                        UIMOSA at [address] [place] [address]: discussed adding ex with HEP                             Patient will decrease TUG by 10 seconds to display MCID and progression to decreased falls risk.                        Status at IE:44.8                        Current: 25.8sec  Met 4/24/2018      Long Term Goals: To be accomplished in 8 weeks:                        Patient will increase strength to 4/5 throughout B LEs to aid in return recreational activities and ADLs.                       WIOFGT at IE: 3/5 throughout several planes in the R LE                        Current:4/5 in knee flex/ext, 3/5 in R hip flex                            Patient will improve Tinetti score to 19 to demonstrate decreased risk for falls.                        Status at IE:14                        Current: 23                            Patient will ambulate 1000ft on level surface with LRAD.                       LTBWJO at IE: 50ft                        Current:1000ft with quad cane                            Patient will improve FOTO to 45 points overall to demonstrate improvement in functional ability.                         VIBDLT at IE 16                        Current:45/100 Met 4/24/2018    PLAN  []  Upgrade activities as tolerated     [x]  Continue plan of care  []  Update interventions per flow sheet       []  Discharge due to:_  []  Other:_      Bjorn Golden, PT, DPT 4/24/2018  11:06 AM    Future Appointments  Date Time Provider Paul Gerda   4/24/2018 1:30 PM Emmit Spatz, OT [facility]   4/26/2018 11:00 AM Emmit Spatz, OT [facility]   4/26/2018 12:30 PM [facility]   5/3/2018 11:00  [address] [facility]   5/10/2018 1:00 PM Bjorn Golden, PT, DPT [facility]   5/10/2018 2:00 PM Emmit Spatz, [id number] [facility]

## 2018-04-26 ENCOUNTER — HOSPITAL ENCOUNTER (OUTPATIENT)
Dept: PHYSICAL THERAPY | Age: 58
Discharge: HOME | End: 2018-04-26
Payer: MEDICARE

## 2018-04-26 ENCOUNTER — HOSPITAL ENCOUNTER (OUTPATIENT)
Dept: PHYSICAL THERAPY | Age: 58
End: 2018-04-26
Payer: MEDICARE

## 2018-04-26 PROCEDURE — 97116 GAIT TRAINING THERAPY: CPT

## 2018-04-26 PROCEDURE — 97112 NEUROMUSCULAR REEDUCATION: CPT

## 2018-04-26 PROCEDURE — 97110 THERAPEUTIC EXERCISES: CPT

## 2018-04-26 NOTE — PROGRESS NOTES
PT DAILY TREATMENT NOTE - [facility]     Patient Name: Laci Georges  Date:2018  : 1960  [x]  Patient  Verified  Payor: Marciano Duckworth / Plan: [address] [place] [address] HMO / Product Type: Managed Care Medicare /    In time:11:32  Out time:12:05  Total Treatment Time (min): 32  Total Timed Codes (min): 32  1:1 Treatment Time ( [address] only): 32   Visit #: 14 of 24    Treatment Area: Nontraumatic intracerebral hemorrhage, unspecified [I61.9]  Other reduced mobility [Z74.09]    SUBJECTIVE  Pain Level (0-10 scale): 0/10  Any medication changes, allergies to medications, adverse drug reactions, diagnosis change, or new procedure performed?: [x] No    [] Yes (see summary sheet for update)  Subjective functional status/changes:   [] No changes reported  \"I don't have any pain today. \"    OBJECTIVE    22 min Neuromuscular Re-education:  [x]  See flow sheet : Amb with Head turns, airex beam walking , cone taps   Rationale: increase ROM, increase strength, improve coordination and improve balance  to improve the patients ability to return to prior level of physical activity. 10 min Gait Training:  __500 feet with _no__ device on level surfaces with __CGA_ level of assist   Rationale: With   [] TE   [] TA   [] neuro   [] other: Patient Education: [x] Review HEP    [] Progressed/Changed HEP based on:   [] positioning   [] body mechanics   [] transfers   [] heat/ice application    [] other:      Other Objective/Functional Measures:      Pain Level (0-10 scale) post treatment: 0/10    ASSESSMENT/Changes in Function: Pt continues to progress with balance activities and amb tolerance. Pt denied pain post tx.      Patient will continue to benefit from skilled PT services to modify and progress therapeutic interventions, address functional mobility deficits, address ROM deficits, address strength deficits, analyze and address soft tissue restrictions, analyze and cue movement patterns, analyze and modify body mechanics/ergonomics and assess and modify postural abnormalities to attain remaining goals. []  See Plan of Care  []  See progress note/recertification  []  See Discharge Summary         Progress towards goals / Updated goals:  Long Term Goals: To be accomplished in 3 weeks:                        UAFWBKM will increase strength to 4/5 throughout B LEs to aid in return recreational activities and ADLs.                       AMGPSG at IE: 3/5 throughout several planes in the R LE                        Current:4/5 in knee flex/ext, 3/5 in R hip flex progressing                            Patient will improve Tinetti score to 24 to demonstrate decreased risk for falls. (Don Chu)                        OSAJWV at IE:14                        Current: 21                            Patient will ambulate 1000ft on level surface with no AD. (UPDATED)                        XWHOBF at IE: 50ft                        Current:1000ft with quad cane                             Patient will improve FOTO to 45 points overall to demonstrate improvement in functional ability.                         VUPUZK at IE 16                        Current:45/100 Met 4/24/2018     PLAN  [x]  Upgrade activities as tolerated     [x]  Continue plan of care  []  Update interventions per flow sheet       []  Discharge due to:_  []  Other:_      Eva Arciniega 4/26/2018  12:14 PM    Future Appointments  Date Time Provider Paul Lorenz   5/1/2018 10:00  [address] [facility]   5/1/2018 11:30 AM Richi Campbell OT [facility]   5/3/2018 11:00  [address] [facility]   5/3/2018 11:30 AM Richi Campbell OT [facility]   5/8/2018 10:30 AM Herminia Payan, PT, DPT [facility]   5/8/2018 11:00 AM Richi Campbell OT [facility]   5/10/2018 11:00 AM Richi Campbell OT [facility]   5/10/2018 12:30  [address] [facility]

## 2018-04-26 NOTE — PROGRESS NOTES
OT DAILY TREATMENT NOTE - [facility]     Patient Name: Mauricio Burnette  Date:2018   : 1960  [x]  Patient  Verified  Payor: Onofre Pemberton / Plan: [address] [place] [address] HMO / Product Type: Managed Care Medicare /    In time:1100  Out time:1130  Total Treatment Time (min): 30  Total Timed Codes (min): 30  1:1 Treatment Time ( [address] only): 30   Visit #: 7 of 12    Treatment Area: Nontraumatic intracerebral hemorrhage, unspecified [I61.9]  Other reduced mobility [Z74.09]    SUBJECTIVE  Pain Level (0-10 scale): 0/10    Any medication changes, allergies to medications, adverse drug reactions, diagnosis change, or new procedure performed?: [x] No    [] Yes (see summary sheet for update)  Subjective functional status/changes:   [] No changes reported  \"The brains      OBJECTIVE     30 min Therapeutic Exercise:  [x] See flow sheet :   Rationale: increase ROM, increase strength and improve coordination to improve the patients ability to perform functional grasping and ADLs. With   [x] TE   [] TA   [] neuro   [] other: Patient Education: [x] Review HEP    [] Progressed/Changed HEP based on:   [] positioning   [] body mechanics   [] transfers   [] heat/ice application   [] Splint wear/care   [] Sensory re-education   [] scar management      [] other:          Other Objective/Functional Measures: N/T      Pain Level (0-10 scale) post treatment: 0/10    ASSESSMENT/Changes in Function: Patient tolerated session well today. She is continuing to work on shoulder ROM exercises to decrease pain. Tolerated UBE for 5 minutes today with pain at full extension. Continue POC. Patient will continue to benefit from skilled OT services to address ROM deficits, address strength deficits and analyze and address soft tissue restrictions to attain remaining goals. [x]  See Plan of Care  []  See progress note/recertification  []  See Discharge Summary         Progress towards goals / Updated goals:  Short Term Goals:  To be accomplished in 2  weeks:  Goal:* Patient will be compliant with initial home exercise program to take an active role in their rehabilitation process. Status at eval:   HEP initiated  Current Status:  Patient continuing HEP MET      Long Term Goals: To be accomplished in 4 weeks:  Goal:*Patient will increase  strength by at least 10 lbs at right hand in order to carry a small shopping bag. Status at Eval: 4 lbs   Current status: 9 lbs Progressing        Goal:*Patient will increase pinch strength by at least 3 lbs at right hand in order to manipulate fasteners. Status at Eval: 7 lbs  Current Status: 8 lbs Progressing      Goal:*Patient will be able to demonstrate 3/5 MMT and move right UE through full ROM against gravity with no added resistance.   Status at Eval: 2/5 right shoulder flexion and abduction  Current Status: 2+/5 progressing    PLAN  [x]  Upgrade activities as tolerated     [x]  Continue plan of care  []  Update interventions per flow sheet       []  Discharge due to:_  []  Other:_      Emmit Spatz, OT 4/26/2018  12:42 PM

## 2018-05-01 ENCOUNTER — HOSPITAL ENCOUNTER (OUTPATIENT)
Dept: PHYSICAL THERAPY | Age: 58
Discharge: HOME | End: 2018-05-01
Payer: MEDICARE

## 2018-05-01 PROCEDURE — 97112 NEUROMUSCULAR REEDUCATION: CPT

## 2018-05-01 PROCEDURE — 92507 TX SP LANG VOICE COMM INDIV: CPT

## 2018-05-01 PROCEDURE — 97110 THERAPEUTIC EXERCISES: CPT

## 2018-05-01 NOTE — PROGRESS NOTES
OT DAILY TREATMENT NOTE - [facility]     Patient Name: Connie Khan  Date:2018  : 1960  [x]  Patient  Verified  Payor: Kolby Smith / Plan: [address] [place] [address] HMO / Product Type: Managed Care Medicare /    In time:1130  Out time:1200  Total Treatment Time (min): 30  Total Timed Codes (min): 30  1:1 Treatment Time ( [address] only): 30   Visit #: 8 of 12    Treatment Area: Nontraumatic intracerebral hemorrhage, unspecified [I61.9]  Other reduced mobility [Z74.09]    SUBJECTIVE  Pain Level (0-10 scale): 0  Any medication changes, allergies to medications, adverse drug reactions, diagnosis change, or new procedure performed?: [x] No    [] Yes (see summary sheet for update)  Subjective functional status/changes:   [] No changes reported  \"I have been doing the shoulder exercises. \"    OBJECTIVE    30 min Therapeutic Exercise:  [x] See flow sheet :   Rationale: increase ROM, increase strength and improve coordination to improve the patients ability to perform ADLs and IADLs. With   [x] TE   [] TA   [] neuro   [] other: Patient Education: [x] Review HEP    [] Progressed/Changed HEP based on:   [] positioning   [] body mechanics   [] transfers   [] heat/ice application   [] Splint wear/care   [] Sensory re-education   [] scar management      [] other:         Other Objective/Functional Measures: Not measured today     Pain Level (0-10 scale) post treatment: 0    ASSESSMENT/Changes in Function: Patient continuing to tolerate session well, focusing more on shoulder stiffness and pain now. Completed 9 mins on UBE level 2 with fatigue post exercise. Patient will continue to benefit from skilled OT services to address ROM deficits, address strength deficits and analyze and address soft tissue restrictions to attain remaining goals. [x]  See Plan of Care  []  See progress note/recertification  []  See Discharge Summary         Progress towards goals / Updated goals:  Short Term Goals:  To be accomplished in 2  weeks:  Goal:* Patient will be compliant with initial home exercise program to take an active role in their rehabilitation process. Status at eval:   HEP initiated  Current Status:  Patient continuing HEP MET      Long Term Goals: To be accomplished in 4 weeks:  Goal:*Patient will increase  strength by at least 10 lbs at right hand in order to carry a small shopping bag. Status at Eval: 4 lbs   Current status: 9 lbs Progressing        Goal:*Patient will increase pinch strength by at least 3 lbs at right hand in order to manipulate fasteners. Status at Eval: 7 lbs  Current Status: 8 lbs Progressing      Goal:*Patient will be able to demonstrate 3/5 MMT and move right UE through full ROM against gravity with no added resistance.   Status at Eval: 2/5 right shoulder flexion and abduction  Current Status: 2+/5 progressing    PLAN  [x]  Upgrade activities as tolerated     [x]  Continue plan of care  []  Update interventions per flow sheet       []  Discharge due to:_  []  Other:_      Brissa Alcantara OT 5/1/2018  11:18 AM

## 2018-05-01 NOTE — PROGRESS NOTES
PT DAILY TREATMENT NOTE - [facility]     Patient Name: Damien Kalia  Date:2018  : 1960  [x]  Patient  Verified  Payor: Pati Lucero / Plan: [address] [place] [address] HMO / Product Type: Managed Care Medicare /    In time:10:10  Out time:10:50  Total Treatment Time (min): 40  Total Timed Codes (min): 40  1:1 Treatment Time ( [address] only): 40   Visit #: 15 of 24    Treatment Area: Nontraumatic intracerebral hemorrhage, unspecified [I61.9]  Other reduced mobility [Z74.09]    SUBJECTIVE  Pain Level (0-10 scale): 1/10  Any medication changes, allergies to medications, adverse drug reactions, diagnosis change, or new procedure performed?: [x] No    [] Yes (see summary sheet for update)  Subjective functional status/changes:   [] No changes reported  \"My knee is a little sore today. \"      OBJECTIVE     40 min Neuromuscular Re-education:  [x]  See flow sheet :   Rationale: increase ROM, increase strength and improve coordination  to improve the patients ability to return to prior level of physical activity. With   [] TE   [] TA   [] neuro   [] other: Patient Education: [x] Review HEP    [] Progressed/Changed HEP based on:   [] positioning   [] body mechanics   [] transfers   [] heat/ice application    [] other:      Other Objective/Functional Measures:      Pain Level (0-10 scale) post treatment: 0/10    ASSESSMENT/Changes in Function: Pt performed tx without AFO in order to improve DF control. Pt amb without cane or AFO in hallway. Pt required VC for mild rotation of center line during amb. Pt able to perform balance on airex beam without AFO. Pt reported increased fatigue post tx in Right LE.     Patient will continue to benefit from skilled PT services to modify and progress therapeutic interventions, address functional mobility deficits, address ROM deficits, address strength deficits, analyze and address soft tissue restrictions, analyze and cue movement patterns, analyze and modify body mechanics/ergonomics and assess and modify postural abnormalities to attain remaining goals. []  See Plan of Care  []  See progress note/recertification  []  See Discharge Summary         Progress towards goals / Updated goals:  Long Term Goals: To be accomplished in 3 weeks:                        KRLMMLH will increase strength to 4/5 throughout B LEs to aid in return recreational activities and ADLs.                       GGQJWH at IE: 3/5 throughout several planes in the R LE                        Current:4/5 in knee flex/ext, 3/5 in R hip flex progressing                            Patient will improve Tinetti score to 24 to demonstrate decreased risk for falls. (Kirt Hazel)                        BPNAFY at IE:14                        Current: 21                            Patient will ambulate 1000ft on level surface with no AD. (UPDATED)                        ZGRPVS at IE: 50ft                        Current:1000ft with quad cane                             Patient will improve FOTO to 45 points overall to demonstrate improvement in functional ability.                         CLUDHT at IE 16                        Current:45/100 Met 4/24/2018        PLAN  [x]  Upgrade activities as tolerated     [x]  Continue plan of care  []  Update interventions per flow sheet       []  Discharge due to:_  []  Other:_      Mee Mow 5/1/2018  10:17 AM    Future Appointments  Date Time Provider Paul Lorenz   5/1/2018 11:30 AM Marybel Lovelace OT [facility]   5/3/2018 11:00  [address] [facility]   5/3/2018 11:30 AM Mraybel Lovelace OT [facility]   5/3/2018 1:00 PM BESSY Limon [facility]   5/8/2018 10:30 AM Catia Smith, PT, DPT [facility]   5/8/2018 11:00 AM Marybel Lovelace OT [facility]   5/10/2018 11:00 AM Marybel Lovelace OT [facility]   5/10/2018 12:30  [address] [facility]

## 2018-05-01 NOTE — PROGRESS NOTES
ST DAILY TREATMENT NOTE    Patient Name: Johanny Do  Date:2018  : 1960  [x]  Patient  Verified  Payor: Payor: Vicky Heath / Plan: [address] [place] [address] HMO / Product Type: Managed Care Medicare /   In time: 22  Out time: 1010  Total Treatment Time (min): 30  Visit #: 4 of 12    Treatment Diagnosis: Nontraumatic intracerebral hemorrhage, unspecified [I61.9]  Other reduced mobility [Z74.09]    SUBJECTIVE  Pain Level (0-10 scale): 0  Any medication changes, allergies to medications, adverse drug reactions, diagnosis change, or new procedure performed?: [x] No    [] Yes (see summary sheet for update)    Subjective functional status/changes:   [x] No changes reported    OBJECTIVE  Treatment provided includes:  Increase/Improve:  []  Voice Quality []  Cognitive Linguistic Skills []  Laryngeal/Pharyngeal Exercises   []  Vocal Loudness []  Reading Comprehension []  Swallowing Skills    []  Vocal Cord Function []  Auditory Comprehension [x]  Oral Motor Skills   []  Resonance []  Writing Skills []  Compensatory strategies    [x]  Speech Intelligibility []  Expressive Language []  Attention   [x]  Breath Support/Coord.  []  Receptive language []  Memory   []  Articulation []  Safety Awareness []    []  Fluency []  Word Retrieval []        Treatment Provided:  Pt performed oral motor exercises targeting lingual and labial strength, speech and ROM x15 with min cues   Pt produced CV combinations for /b, d, p, k, t, g, m, n/ with increased speed and min cues  Pt produced sentences between the range of 65-73 dB independently  Pt engaged in sentence-level intelligibility drills with min cues for over-articulation of consonant sounds    Patient/Caregiver  Education: [x] Review HEP      HEP/Handouts given: Continue HEP    Pain Level (0-10 scale) post treatment: 0    ASSESSMENT   [x]   Improving appropriately and progressing toward goals  []   Improving slowly and progressing toward goals  []   Approximating goals/maximum potential  [x]   Continues to benefit from skilled therapy to address remaining functional deficits  []   Not progressing toward goals and plan of care will be adjusted    Patient will continue to benefit from skilled therapy to address remaining functional deficits: dysarthria     Progress towards goals / Updated goals:  Pt demonstrated appropriate vocal intensity during sentences, required min verbal cues during conversational speech. Goals for this certification period to be accomplished in 6 weeks:  1) Pt will perform oral motor exercises targeting improved lingual strength and ROM x15 in order to improve tongue strength for speech production. 5/1/18: Pt performed oral motor exercises targeting lingual and labial strength, speed and ROM x15 with mod cues and improved speed  2) Pt will perform AMRs with no more than 2 cues in order to improve overall speed of speech production. 5/1/18: Produced AMRs with regular articulation, reduced rate. 3) Pt will engage in CV motor speech drills with varying consonants with no more than 2 cues for speed and precision of production from SLP. 5/1/18: 2 cues  4) Pt will read sentence-level material within the range of 70-73 dB in order to improve overall phonation/respiration coordination during lengthier utterances. 5/1/18: Pt produced sentences between the range of 65-73 dB   5) Pt will engage in sentence-level intelligibility drills with no more than 2 cues from SLP for improved articulatory precision and speed and pitch maintenance.  5/1/18: Pt engaged in a sentence-level intelligibility drill with mod cue for over-articulation consonant sounds    PLAN  [x]  Continue plan of care  []  Modify Goals/Treatment Plan      []  Discharge due to:  [] Other:    Aidan Tyler, BESSY 5/1/2018  1:36 PM    Future Appointments  Date Time Provider Paul Lorenz   5/2/2018 1:30 PM Sukhwinder Altman [facility]   5/8/2018 10:30 AM Jeanna Martinez, PT, DPT [facility]   5/8/2018 11:00 AM Kody Albarran OT [facility]   5/10/2018 11:00 AM Kody Albarran OT [facility]   5/10/2018 12:30  [address] [facility]

## 2018-05-02 ENCOUNTER — HOSPITAL ENCOUNTER (OUTPATIENT)
Dept: PHYSICAL THERAPY | Age: 58
Discharge: HOME | End: 2018-05-02
Payer: MEDICARE

## 2018-05-02 PROCEDURE — 97112 NEUROMUSCULAR REEDUCATION: CPT

## 2018-05-02 NOTE — PROGRESS NOTES
PT DAILY TREATMENT NOTE - [facility]     Patient Name: Connie Khan  Date:2018  : 1960  [x]  Patient  Verified  Payor: Kolby Smith / Plan: [address] [place] [address] HMO / Product Type: Managed Care Medicare /    In time:1:30  Out time:2:08  Total Treatment Time (min): 38  Total Timed Codes (min): 38  1:1 Treatment Time ( [address] only): 30   Visit #: 16 of 24    Treatment Area: Nontraumatic intracerebral hemorrhage, unspecified [I61.9]  Other reduced mobility [Z74.09]    SUBJECTIVE  Pain Level (0-10 scale): 0/10  Any medication changes, allergies to medications, adverse drug reactions, diagnosis change, or new procedure performed?: [x] No    [] Yes (see summary sheet for update)  Subjective functional status/changes:   [] No changes reported  \"I feel a little more tired today but I'm alright. \"    OBJECTIVE      38 min Neuromuscular Re-education:  []  See flow sheet :   Rationale: increase ROM, increase strength and improve coordination  to improve the patients ability to return to prior level of physical activity. With   [] TE   [] TA   [] neuro   [] other: Patient Education: [x] Review HEP    [] Progressed/Changed HEP based on:   [] positioning   [] body mechanics   [] transfers   [] heat/ice application    [] other:      Other Objective/Functional Measures:      Pain Level (0-10 scale) post treatment: 0/10    ASSESSMENT/Changes in Function: Pt performed amb without AFO again and performed well with no drop foot symptoms or decreased DF in general. Pt was more fatigued by amb and balance training. Pt shows excellent steppage strategies with balance correct but difficulty with ankle strategy usage.      Patient will continue to benefit from skilled PT services to modify and progress therapeutic interventions, address functional mobility deficits, address ROM deficits, address strength deficits, analyze and address soft tissue restrictions, analyze and cue movement patterns, analyze and modify body mechanics/ergonomics and assess and modify postural abnormalities to attain remaining goals. []  See Plan of Care  []  See progress note/recertification  []  See Discharge Summary         Progress towards goals / Updated goals:  Long Term Goals: To be accomplished in 3 weeks:                        FKUAVRA will increase strength to 4/5 throughout B LEs to aid in return recreational activities and ADLs.                       KTPAPU at IE: 3/5 throughout several planes in the R LE                        Current:4/5 in knee flex/ext, 3/5 in R hip flex progressing                            Patient will improve Tinetti score to 24 to demonstrate decreased risk for falls. (Hardy Grey)                        GHLPAR at IE:14                        Current: 21                            Patient will ambulate 1000ft on level surface with no AD. (UPDATED)                        QWVJOJ at IE: 50ft                        Current:1000ft with quad cane                             Patient will improve FOTO to 45 points overall to demonstrate improvement in functional ability.                         LFXQTI at IE 16                        Current:45/100 Met 4/24/2018     PLAN  [x]  Upgrade activities as tolerated     [x]  Continue plan of care  []  Update interventions per flow sheet       []  Discharge due to:_  []  Other:_      Ferny Shannon 5/2/2018  1:50 PM    Future Appointments  Date Time Provider Paul Lorenz   5/8/2018 10:30 AM Osman Senior, PT, DPT [facility]   5/8/2018 11:00 AM Nelson Salomon OT [facility]   5/10/2018 11:00 AM Nelson Salomon OT [facility]   5/10/2018 12:30  [address] [facility]

## 2018-05-03 ENCOUNTER — APPOINTMENT (OUTPATIENT)
Dept: PHYSICAL THERAPY | Age: 58
End: 2018-05-03
Payer: MEDICARE

## 2018-05-10 ENCOUNTER — APPOINTMENT (OUTPATIENT)
Dept: PHYSICAL THERAPY | Age: 58
End: 2018-05-10
Payer: MEDICARE

## 2018-05-17 ENCOUNTER — HOSPITAL ENCOUNTER (OUTPATIENT)
Dept: PHYSICAL THERAPY | Age: 58
Discharge: HOME | End: 2018-05-17
Payer: MEDICARE

## 2018-05-17 PROCEDURE — 97116 GAIT TRAINING THERAPY: CPT

## 2018-05-17 PROCEDURE — 97112 NEUROMUSCULAR REEDUCATION: CPT

## 2018-05-17 PROCEDURE — 97110 THERAPEUTIC EXERCISES: CPT

## 2018-05-17 NOTE — PROGRESS NOTES
PT DAILY TREATMENT NOTE - [facility]     Patient Name: Fiona Stinson  Date:2018  : 1960  [x]  Patient  Verified  Payor: Lo Liu / Plan: [address] [place] [address] HMO / Product Type: Managed Care Medicare /    In time:1:00  Out time:1:38  Total Treatment Time (min): 38  Total Timed Codes (min): 38  1:1 Treatment Time ( [address] only): 45   Visit #: 17 of 24    Treatment Area: Nontraumatic intracerebral hemorrhage, unspecified [I61.9]  Other reduced mobility [Z74.09]    SUBJECTIVE  Pain Level (0-10 scale): 0/10  Any medication changes, allergies to medications, adverse drug reactions, diagnosis change, or new procedure performed?: [x] No    [] Yes (see summary sheet for update)  Subjective functional status/changes:   [] No changes reported  \"I'm alright today. \"    OBJECTIVE      23 min Neuromuscular Re-education:  [x]  See flow sheet :   Rationale: increase ROM, increase strength and improve coordination  to improve the patients ability to return to prior level of physical activity. 15 min Gait Training:  _500__ feet with __No_ device on level surfaces with __Supervision/SBA_ level of assist   Rationale: With   [] TE   [] TA   [] neuro   [] other: Patient Education: [x] Review HEP    [] Progressed/Changed HEP based on:   [] positioning   [] body mechanics   [] transfers   [] heat/ice application    [] other:      Other Objective/Functional Measures:      Pain Level (0-10 scale) post treatment: 0/10    ASSESSMENT/Changes in Function: Pt continues to be challenged by amb activities. Pt performed tandem airex beam walking and cone navigation for continuation of improving balance and sequencing. Pt perform general forward and backward amb without AD.      Patient will continue to benefit from skilled PT services to modify and progress therapeutic interventions, address functional mobility deficits, address ROM deficits, address strength deficits, analyze and address soft tissue restrictions, analyze and cue movement patterns, analyze and modify body mechanics/ergonomics and assess and modify postural abnormalities to attain remaining goals. []  See Plan of Care  []  See progress note/recertification  []  See Discharge Summary         Progress towards goals / Updated goals:  Long Term Goals: To be accomplished in 3 weeks:                        KMIWFQF will increase strength to 4/5 throughout B LEs to aid in return recreational activities and ADLs.                       WEDWSI at IE: 3/5 throughout several planes in the R LE                        Current:4/5 in knee flex/ext, 3/5 in R hip flex progressing                            Patient will improve Tinetti score to 24 to demonstrate decreased risk for falls. (Krista Chowdary)                        PXXAQL at IE:14                        Current: 21                            Patient will ambulate 1000ft on level surface with no AD. (UPDATED)                        VCUHAD at IE: 51ft                        Current:Pt able to amb 1000ft on level surface without AD but shows decreased step length with L LE                            Patient will improve FOTO to 45 points overall to demonstrate improvement in functional ability.                         VBKMJC at IE 16                        Current:45/100 Met 4/24/2018        PLAN  [x]  Upgrade activities as tolerated     [x]  Continue plan of care  []  Update interventions per flow sheet       []  Discharge due to:_  []  Other:_      Jennifer Randle, LINA 5/17/2018  1:38 PM    Future Appointments  Date Time Provider Paul Lorenz   5/18/2018 11:15 AM Torri Saldana, BESSY [facility]   5/18/2018 1:00 PM Len Alatorre, PT, DPT [facility]

## 2018-05-17 NOTE — PROGRESS NOTES
OT DAILY TREATMENT NOTE - [facility]     Patient Name: Mayra Delgado  Date:2018  : 1960  [x]  Patient  Verified  Payor: Josh Kidney / Plan: [address] [place] [address] HMO / Product Type: Managed Care Medicare /    In time:1211  Out time: 1250  Total Treatment Time (min): 39  Total Timed Codes (min): 39  1:1 Treatment Time ( [address] only): 44   Visit #: 9 of 12    Treatment Area: Nontraumatic intracerebral hemorrhage, unspecified [I61.9]  Other reduced mobility [Z74.09]    SUBJECTIVE  Pain Level (0-10 scale): 0/10  Any medication changes, allergies to medications, adverse drug reactions, diagnosis change, or new procedure performed?: [x] No    [] Yes (see summary sheet for update)  Subjective functional status/changes:   [] No changes reported  \"I have been working my shoulder a lot at home. \"    OBJECTIVE    39 min Therapeutic Exercise:  [x] See flow sheet :   Rationale: increase ROM, increase strength and improve coordination to improve the patients ability to grasp and perform ADLs. With   [x] TE   [] TA   [] neuro   [] other: Patient Education: [x] Review HEP    [] Progressed/Changed HEP based on:   [] positioning   [] body mechanics   [] transfers   [] heat/ice application   [] Splint wear/care   [] Sensory re-education   [] scar management      [] other:        Other Objective/Functional Measures: NT     Pain Level (0-10 scale) post treatment: 0/10    ASSESSMENT/Changes in Function: Patient tolerated session well today. Patient right shoulder ROM has increased 10 degrees at shoulder flexion today however reports pain and stiffness overall. Patient will continue to benefit from skilled OT services to address ROM deficits and address strength deficits to attain remaining goals. [x]  See Plan of Care  []  See progress note/recertification  []  See Discharge Summary         Progress towards goals / Updated goals:  Short Term Goals:  To be accomplished in 2  weeks:  Goal:* Patient will be compliant with initial home exercise program to take an active role in their rehabilitation process. Status at eval:   HEP initiated  Current Status:  Patient continuing HEP MET      Long Term Goals: To be accomplished in 4 weeks:  Goal:*Patient will increase  strength by at least 10 lbs at right hand in order to carry a small shopping bag. Status at Eval: 4 lbs   Current status: 9 lbs Progressing        Goal:*Patient will increase pinch strength by at least 3 lbs at right hand in order to manipulate fasteners. Status at Eval: 7 lbs  Current Status: 8 lbs Progressing      Goal:*Patient will be able to demonstrate 3/5 MMT and move right UE through full ROM against gravity with no added resistance.   Status at Eval: 2/5 right shoulder flexion and abduction  Current Status: 2+/5 progressing    PLAN  [x]  Upgrade activities as tolerated     [x]  Continue plan of care  []  Update interventions per flow sheet       []  Discharge due to:_  []  Other:_      Dung Smith OT 5/17/2018  12:34 PM

## 2018-05-18 ENCOUNTER — HOSPITAL ENCOUNTER (OUTPATIENT)
Dept: PHYSICAL THERAPY | Age: 58
Discharge: HOME | End: 2018-05-18
Payer: MEDICARE

## 2018-05-18 PROCEDURE — 97530 THERAPEUTIC ACTIVITIES: CPT | Performed by: PHYSICAL THERAPIST

## 2018-05-18 PROCEDURE — G8999 MOTOR SPEECH CURRENT STATUS: HCPCS

## 2018-05-18 PROCEDURE — G8978 MOBILITY CURRENT STATUS: HCPCS | Performed by: PHYSICAL THERAPIST

## 2018-05-18 PROCEDURE — 92507 TX SP LANG VOICE COMM INDIV: CPT

## 2018-05-18 PROCEDURE — 97112 NEUROMUSCULAR REEDUCATION: CPT | Performed by: PHYSICAL THERAPIST

## 2018-05-18 PROCEDURE — G9186 MOTOR SPEECH GOAL STATUS: HCPCS

## 2018-05-18 PROCEDURE — G8979 MOBILITY GOAL STATUS: HCPCS | Performed by: PHYSICAL THERAPIST

## 2018-05-18 NOTE — PROGRESS NOTES
[facility]  [address] [facility]  [facility], [address]  Ph [phone]  Fx [phone]    Continued Plan of Care/ Re-certification for Physical Therapy Services    Patient name: Johanny Do Start of Care: 3/1/2018   Referral source: Erickson Villar MD : 1960   Medical/Treatment Diagnosis: Nontraumatic intracerebral hemorrhage, unspecified [I61.9]  Other reduced mobility [Z74.09] Onset Date:2018     Prior Hospitalization: see medical history Provider#: [id number]   Medications: Verified on Patient Summary List    Comorbidities: hx CA in R eye (), blind in R eye, CVA 2017, HTN, A-fib. Prior Level of Function:independent w/ ADLs    Visits from Start of Care: 18    Missed Visits: The Plan of Care and following information is based on the patient's current status:    Key functional changes: 4      Problems/ barriers to goal attainment: NA     Problem List: decrease ROM, decrease strength, impaired gait/ balance, decrease ADL/ functional abilitiies, decrease activity tolerance, decrease flexibility/ joint mobility and decrease transfer abilities    Treatment Plan: Therapeutic exercise, Therapeutic activities, Neuromuscular re-education, Physical agent/modality, Gait/balance training, Patient education, Self Care training, Functional mobility training, Home safety training and Stair training       Goals for this certification period to be accomplished in 3 weeks:  Long Term Goals: To be accomplished in 3 weeks:                        Patient will increase strength to 4/5 throughout B LEs to aid in return recreational activities and ADLs.                       QNWICS at IE: 3/5 throughout several planes in the R LE                        Current:4/5 in knee flex/ext, 3/5 in R hip flex progressing                            Patient will improve Tinetti score to 24 to demonstrate decreased risk for falls.  (UPDATED)                        XOCUQJ at IE:14                        Current: 25, Met 5/18/2018                            Patient will ambulate 1000ft on level surface with no AD. (UPDATED)                        DTDUZT at IE: 51ft                        Current:Pt able to amb 1000ft on level surface without AD but shows decreased step length with L LE                            Patient will improve FOTO to 45 points overall to demonstrate improvement in functional ability.                       YZQFYN at IE 16                        Current:45/100 Met 4/24/2018     Frequency / Duration: Patient to be seen 3 times per week for 3 weeks:    Assessment / Recommendations:Patient responds well to treatment session. Patient has made good progress to date. Has progressed on Tinetti falls risk assessment, to the low falls risk category. Continues to have some strength deficits. Will continue to benefit from skilled PT services to address these deficits. No adverse effects were noted from today's treatment session        Patient will continue to benefit from skilled PT services to modify and progress therapeutic interventions, address functional mobility deficits, address ROM deficits, address strength deficits, analyze and cue movement patterns, analyze and modify body mechanics/ergonomics, assess and modify postural abnormalities, address imbalance/dizziness and instruct in home and community integration to attain remaining goals. G-Codes (GP)  Mobility  [id number] Current  CK= 40-59%  [id number] Goal  CK= 40-59%    The severity rating is based on clinical judgment and the FOTO score. Certification Period: 5/18/2018 - 6/8/2018    Amanda Nam, PT, DPT 5/18/2018 2:18 PM    ________________________________________________________________________  I certify that the above Therapy Services are being furnished while the patient is under my care. I agree with the treatment plan and certify that this therapy is necessary.     [] I have read the above and request that my patient continue as recommended.   [] I have read the above report and request that my patient continue therapy with the following changes/special instructions: _______________________________________  [] I have read the above report and request that my patient be discharged from therapy    Physician's Signature:________________________________Date:___________Time:__________    Please sign and return to [facility]  [address], [address]  Ph [phone]  Fx [phone]

## 2018-05-18 NOTE — PROGRESS NOTES
[facility]  [address], [address]  Ph [phone]  Fx [phone]    Continued Plan of Care/ Re-certification for Speech Therapy Services    Patient name: Rosa Woodson Start of Care: 3/9/2018   Referral source: Hailey Ruiz MD : 1960   Medical/Treatment Diagnosis: Dysarthria [R47.1] Onset Date:2017     Prior Hospitalization: see medical history Provider#: [id number]   Medications: Verified on Patient Summary List    Comorbidities: Depression, High Blood Pressure, Visual Impairements  Prior Level of Function:Independent prior to CVA  Visits from Start of Care: 10    Missed Visits: 3    **PROGRESS IMPACTED BY DECREASED ATTENDANCE (SLP OUT FOR 2 WEEKS)    The Plan of Care and following information is based on the patient's current status:  Goal: Pt will perform oral motor exercises targeting improved lingual strength and ROM x15 in order to improve tongue strength for speech production. Status at evaluation: Weak, imprecise articulation caused by decreased lingual and labial strength  Status at last note/certification: Cues for precision and strength  Current Status: Progressing; Pt demonstrates decreased tongue strength and speed    Goal: Pt will engage in CV motor speech drills with varying consonants with no more than 2 cues for speed and precision of production from SLP. Status at last note/certification: 3 cues  Current Status: Progressing; 3 cues    Goal: Pt will read sentence-level material within the range of 70-73 dB in order to improve overall phonation/respiration coordination during lengthier utterances.     Status at last note/certification: 19-05 dB  Current Status: Progressing; 65-73 dB    Goal: Pt will perform AMRs with no more than 2 cues in order to improve overall speed of speech production.    Status at evaluation: Slow, regular AMRs  Status at last note/certification: Slow and regular  Current Status: Discontinue goal; Not beneficial or measurable at this time; Can use as a warm-up task    Goal: Pt will engage in sentence-level intelligibility drills with no more than 2 cues from SLP for improved articulatory precision and speed and pitch maintenance. Status at last note/certification: 3 cues  Current Status: Progressing; Cues for over-articulation of sounds    Key functional changes:   Pt continues to demonstrate improved performance with the production of CV combinations with decreasing cues for speed and clarity of productions. Problems/ barriers to goal attainment: Physical, Emotional     Problem List:      []aphasic  [x]dysarthric  []dysphagic       []alexic  []agraphic  []dysphonia       []dysfluency  []Cognitive-Linguistic Disorder       []other     Treatment Plan: Oral Motor Therapeutic Excerise, Dysarthria Treatment and Patient Education    Patient Goal (s) has been updated and includes: Dysarthria treatment, improvement of vocal intensity and speech intelligibility, oral motor strength     Goals for this certification period to be accomplished in 6 weeks:  1) Pt will perform oral motor exercises targeting improved lingual strength and ROM with no more than 2 cues from SLP for speed in order to improve tongue strength for speech production. 2) Pt will engage in CV motor speech drills with varying consonants with no more than 2 cues for speed and precision of production from SLP. 3) Pt will read sentence-level material within the range of 70-73 dB in order to improve overall phonation/respiration coordination during lengthier utterances. 4) Pt will engage in sentence-level intelligibility drills with no more than 2 cues from SLP for improved articulatory precision and speed and pitch maintenance.     Frequency / Duration: Patient to be seen 2 times per week for 6 weeks:    Assessment/Recommendations:  It is recommended that the Pt continue to receive skilled Dysarthria therapy to improve her overall conversational vocal intensity, oral motor strength, pitch maintenance, and speed during conversational exchanges. Additionally, therapy is warranted to improve Pt's confidence when speaking. G Codes: Motor:  Current  CK= 40-59%   Goal  CJ= 20-39%    The severity rating is based on the following outcomes:    National Outcomes Measures (NOMS) and Professional Judgement    Certification Period: 5/18/2018 to 6/15/2018    BESSY Welch 5/18/2018 11:20 AM    _____________________________________________________________________    I certify that the above Therapy Services are being furnished while the patient is under my care. I agree with the treatment plan and certify that this therapy is necessary. []  I have read the above report and request that my patient continue as recommended. []  I have read the above report and request that my patient continue therapy with the following changes/special instructions:________________________________________  []I have read the above report and request that my patient be discharged from therapy.     Physician's Signature:_________________ Date:___________Time:__________      Please sign and return to   [facility]  [address], [address]        Ph [phone]  Fx [phone]

## 2018-05-18 NOTE — PROGRESS NOTES
ST DAILY TREATMENT NOTE    Patient Name: Shalonda Francois  Date:2018  : 1960  [x]  Patient  Verified  Payor: Payor: Sunitha Amanda / Plan: [address] [place] [address] HMO / Product Type: Managed Care Medicare /   In time:11:15  Out time:11:50  Total Treatment Time (min): 35  Visit #: 1 of 12    Treatment Diagnosis: Dysarthria [R47.1]    SUBJECTIVE  Pain Level (0-10 scale): 0  Any medication changes, allergies to medications, adverse drug reactions, diagnosis change, or new procedure performed?: [x] No    [] Yes (see summary sheet for update)    Subjective functional status/changes:   [] No changes reported  Pt reports that her voice is sounding about the same, still quiet. OBJECTIVE  Treatment provided includes:  Increase/Improve:  []  Voice Quality []  Cognitive Linguistic Skills []  Laryngeal/Pharyngeal Exercises   [x]  Vocal Loudness []  Reading Comprehension []  Swallowing Skills    []  Vocal Cord Function []  Auditory Comprehension [x]  Oral Motor Skills   []  Resonance []  Writing Skills []  Compensatory strategies    []  Speech Intelligibility []  Expressive Language []  Attention   []  Breath Support/Coord.  []  Receptive language []  Memory   []  Articulation []  Safety Awareness []    []  Fluency []  Word Retrieval []        Treatment Provided:  Pt performed oral motor exercises targeting lingual and labial strength, speech and ROM x15 with mod cues   Pt produced CV combinations for /b, d, p, k, t, g, m, n/ with 2 cues for increased speed   Pt produced sentences between the range of 64-69 dB independently  Pt engaged in sentence-level intelligibility drills with max cues for over-articulation of consonant sounds    Patient/Caregiver  Education: [x] Review HEP      HEP/Handouts given: Continue HEP    Pain Level (0-10 scale) post treatment: 0    ASSESSMENT   []   Improving appropriately and progressing toward goals  [x]   Improving slowly and progressing toward goals  []   Approximating goals/maximum potential  [x]   Continues to benefit from skilled therapy to address remaining functional deficits  []   Not progressing toward goals and plan of care will be adjusted    Patient will continue to benefit from skilled therapy to address remaining functional deficits: Dysarthria    Progress towards goals / Updated goals:  Pt continues to demonstrate improved performance with the production of CV combinations with decreasing cues for speed and clarity of productions. It is recommended that the Pt continue to receive skilled Dysarthria therapy to improve her overall conversational vocal intensity, oral motor strength, pitch maintenance, and speed during conversational exchanges. Additionally, therapy is warranted to improve Pt's confidence when speaking.      PLAN  [x]  Continue plan of care  []  Modify Goals/Treatment Plan      []  Discharge due to:  [] Other:    Goals for this certification period to be accomplished in 6 weeks:  1) Pt will perform oral motor exercises targeting improved lingual strength and ROM with no more than 2 cues from SLP for speed in order to improve tongue strength for speech production. 2) Pt will engage in CV motor speech drills with varying consonants with no more than 2 cues for speed and precision of production from SLP. 3) Pt will read sentence-level material within the range of 70-73 dB in order to improve overall phonation/respiration coordination during lengthier utterances.    4) Pt will engage in sentence-level intelligibility drills with no more than 2 cues from SLP for improved articulatory precision and speed and pitch maintenance.       Julieta Winter, SLP 5/18/2018  12:08 PM    Future Appointments  Date Time Provider Paul Lorenz   5/18/2018 1:00 PM Karon Rios, PT, DPT [facility]

## 2018-05-18 NOTE — PROGRESS NOTES
PT DAILY TREATMENT NOTE - [facility]     Patient Name: Daija Luciano  Date:2018  : 1960  [x]  Patient  Verified  Payor: Yina Morley / Plan: [address] [place] [address] HMO / Product Type: Managed Care Medicare /    In time:1:05  Out time:1:46  Total Treatment Time (min): 41  Total Timed Codes (min): 41  1:1 Treatment Time ( [address] only): 41   Visit #: 18 of 24    Treatment Area: Nontraumatic intracerebral hemorrhage, unspecified [I61.9]  Other reduced mobility [Z74.09]    SUBJECTIVE  Pain Level (0-10 scale): 0  Any medication changes, allergies to medications, adverse drug reactions, diagnosis change, or new procedure performed?: [x] No    [] Yes (see summary sheet for update)  Subjective functional status/changes:   [] No changes reported  Feels alright. Didn't intend to miss the other week. Daughters schedules were changing, and they were unable to bring her to PT.     OBJECTIVE    11 min Therapeutic Activity:  [x]  See flow sheet :   Rationale: increase ROM, increase strength and improve coordination  to improve the patients ability to complete ADLs     30 min Neuromuscular Re-education:  [x]  See flow sheet :   Rationale: improve coordination, improve balance and increase proprioception  to improve the patients ability to complete ADLs and decrease falls risk         With   [] TE   [] TA   [] neuro   [] other: Patient Education: [x] Review HEP    [] Progressed/Changed HEP based on:   [] positioning   [] body mechanics   [] transfers   [] heat/ice application    [] other:      Other Objective/Functional Measures:   MMT: Right hip flexion: 4-/5   Knee ext: 4/5   Knee flex: 5/5   Ankle DF: 5/5   Ankle PF: 4/5  Tinetti: 25/ = low falls risk    Pain Level (0-10 scale) post treatment: 0    ASSESSMENT/Changes in Function: Patient responds well to treatment session. Patient has made good progress to date. Has progressed on Tinetti falls risk assessment, to the low falls risk category.  Continues to have some strength deficits. Will continue to benefit from skilled PT services to address these deficits. No adverse effects were noted from today's treatment session      Patient will continue to benefit from skilled PT services to modify and progress therapeutic interventions, address functional mobility deficits, address ROM deficits, address strength deficits, analyze and cue movement patterns, analyze and modify body mechanics/ergonomics, assess and modify postural abnormalities, address imbalance/dizziness and instruct in home and community integration to attain remaining goals. []  See Plan of Care  [x]  See progress note/recertification  []  See Discharge Summary         Progress towards goals / Updated goals:  Long Term Goals: To be accomplished in 3 weeks:                        DDQWWYF will increase strength to 4/5 throughout B LEs to aid in return recreational activities and ADLs.                       VLEMEG at IE: 3/5 throughout several planes in the R LE                        Current:4/5 in knee flex/ext, 3/5 in R hip flex progressing                            Patient will improve Tinetti score to 24 to demonstrate decreased risk for falls. (Mikal Cool)                        BNHBAQ at IE:14                        Current: 25, Met 5/18/2018                            Patient will ambulate 1000ft on level surface with no AD. (UPDATED)                        OXEWZO at IE: 51ft                        Current:Pt able to amb 1000ft on level surface without AD but shows decreased step length with L LE                            Patient will improve FOTO to 45 points overall to demonstrate improvement in functional ability.                         SFDTPM at IE 16                        Current:45/100 Met 4/24/2018     PLAN  []  Upgrade activities as tolerated     [x]  Continue plan of care  []  Update interventions per flow sheet       []  Discharge due to:_  []  Other:_      Patsy Caldera, PT, DPT 5/18/2018  1:13 PM    No future appointments.

## 2018-05-24 ENCOUNTER — HOSPITAL ENCOUNTER (OUTPATIENT)
Dept: PHYSICAL THERAPY | Age: 58
Discharge: HOME | End: 2018-05-24
Payer: MEDICARE

## 2018-05-24 ENCOUNTER — APPOINTMENT (OUTPATIENT)
Dept: PHYSICAL THERAPY | Age: 58
End: 2018-05-24
Payer: MEDICARE

## 2018-05-24 PROCEDURE — 97110 THERAPEUTIC EXERCISES: CPT

## 2018-05-24 PROCEDURE — 97530 THERAPEUTIC ACTIVITIES: CPT | Performed by: PHYSICAL THERAPIST

## 2018-05-24 PROCEDURE — 97110 THERAPEUTIC EXERCISES: CPT | Performed by: PHYSICAL THERAPIST

## 2018-05-24 NOTE — PROGRESS NOTES
PT DAILY TREATMENT NOTE - [facility]     Patient Name: Whitney Schumacher  Date:2018  : 1960  [x]  Patient  Verified  Payor: Sunitha Beaulieu / Plan: [address] [place] [address] HMO / Product Type: Managed Care Medicare /    In time:1:00  Out time:1:44  Total Treatment Time (min): 44  Total Timed Codes (min): 44  1:1 Treatment Time ([address] only): 40   Visit #: 49 of 12 (add 12 = 24)    Treatment Area: Nontraumatic intracerebral hemorrhage, unspecified [I61.9]  Other reduced mobility [Z74.09]    SUBJECTIVE  Any medication changes, allergies to medications, adverse drug reactions, diagnosis change, or new procedure performed?: [x] No    [] Yes (see summary sheet for update)  Subjective functional status/changes:   [] No changes reported  Feels good. Just has occasional spasticity in the mornings. OBJECTIVE    30 min Therapeutic Exercise:  [x] See flow sheet :   Rationale: increase ROM, increase strength and improve coordination to improve the patients ability to complete ADLs    14 min Therapeutic Activity:  []  See flow sheet :   Rationale: increase ROM and increase strength  to improve the patients ability to complete ADLs        With   [] TE   [] TA   [] neuro   [] other: Patient Education: [x] Review HEP    [] Progressed/Changed HEP based on:   [] positioning   [] body mechanics   [] transfers   [] heat/ice application    [] other:          Pain Level (0-10 scale) post treatment: 0      ASSESSMENT/Changes in Function: Patient responds well to treatment session. Is challenged by strengthening exercises today. Progress with this over the next three weeks, prior to discharge.  No adverse effects were noted from today's treatment session      Patient will continue to benefit from skilled PT services to modify and progress therapeutic interventions, address functional mobility deficits, address ROM deficits, address strength deficits, analyze and address soft tissue restrictions, analyze and cue movement patterns, analyze and modify body mechanics/ergonomics, assess and modify postural abnormalities, address imbalance/dizziness and instruct in home and community integration to attain remaining goals. []  See Plan of Care  []  See progress note/recertification  []  See Discharge Summary         Progress towards goals / Updated goals:  Pain Level (0-10 scale): Long Term Goals: To be accomplished in 3 weeks:                        WMOYTKE will increase strength to 4/5 throughout B LEs to aid in return recreational activities and ADLs.                       ELEKLD at IE: 3/5 throughout several planes in the R LE                        Current:4/5 in knee flex/ext, 3/5 in R hip flex progressing                            Patient will improve Tinetti score to 24 to demonstrate decreased risk for falls. (Damian Dhaliwal)                        KNHTYB at IE:14                        Current: 25, Met 5/18/2018                            Patient will ambulate 1000ft on level surface with no AD. (UPDATED)                        DSLMYH at IE: 51ft                        Current:Pt able to amb 1000ft on level surface without AD but shows decreased step length with L LE                            Patient will improve FOTO to 45 points overall to demonstrate improvement in functional ability.                         TBGVXP at IE 16                        Current:45/100 Met 4/24/2018     PLAN  []  Upgrade activities as tolerated     [x]  Continue plan of care  []  Update interventions per flow sheet       []  Discharge due to:_  []  Other:_      Lauren Fermin, PT, DPT 5/24/2018  1:06 PM    Future Appointments  Date Time Provider Paul Lorenz   5/25/2018 10:30 AM BESSY Ahumada [facility]   5/25/2018 12:30 PM Anabel Martínez PTA [facility]   5/29/2018 10:30 AM Reyna Smith OT [facility]   5/29/2018 11:45 AM BESSY Ahumada [facility]   5/31/2018 1:30 PM Reyna Smith OT [facility]   5/31/2018 3:30 PM Anabel Martínez [facility]   6/1/2018 9:00 AM Hira Shelton PTA [facility]   6/1/2018 11:15 AM BESSY Olivarez [facility]

## 2018-05-24 NOTE — PROGRESS NOTES
OT DAILY TREATMENT NOTE - [facility]     Patient Name: Fiona Stinson  Date:2018  : 1960  [x]  Patient  Verified  Payor: Lo Liu / Plan: [address] [place] [address] HMO / Product Type: Managed Care Medicare /    In time:1100  Out time:1200  Total Treatment Time (min): 60  Total Timed Codes (min): 60  1:1 Treatment Time ( [address] only): 60   Visit #: 10 of 12    Treatment Area: Nontraumatic intracerebral hemorrhage, unspecified [I61.9]  Other reduced mobility [Z74.09]    SUBJECTIVE  Pain Level (0-10 scale): 0  Any medication changes, allergies to medications, adverse drug reactions, diagnosis change, or new procedure performed?: [x] No    [] Yes (see summary sheet for update)  Subjective functional status/changes:   [] No changes reported  \"The arm is feeling really good today. \"    OBJECTIVE    60 min Therapeutic Exercise:  [x] See flow sheet :   Rationale: increase ROM, increase strength and improve coordination to improve the patients ability to lift and carry, perform functional ADLs and IADLs. With   [x] TE   [] TA   [] neuro   [] other: Patient Education: [x] Review HEP    [] Progressed/Changed HEP based on:   [] positioning   [] body mechanics   [] transfers   [] heat/ice application   [] Splint wear/care   [] Sensory re-education   [] scar management      [] other:          Other Objective/Functional Measures: N/T     Pain Level (0-10 scale) post treatment: 0/10    ASSESSMENT/Changes in Function: Patient tolerated treatment well today. She reports increased motion in elbow and shoulder with functional activities and was able to increase level on UBE and HG today. Patient will continue to benefit from skilled OT services to address ROM deficits, address strength deficits and analyze and address soft tissue restrictions to attain remaining goals.      [x]  See Plan of Care  []  See progress note/recertification  []  See Discharge Summary         Progress towards goals / Updated goals:  Short Term Goals: To be accomplished in 2  weeks:  Goal:* Patient will be compliant with initial home exercise program to take an active role in their rehabilitation process. Status at eval:   HEP initiated  Current Status:  Patient continuing HEP MET      Long Term Goals: To be accomplished in 4 weeks:  Goal:*Patient will increase  strength by at least 10 lbs at right hand in order to carry a small shopping bag. Status at Eval: 4 lbs   Current status: 9 lbs Progressing        Goal:*Patient will increase pinch strength by at least 3 lbs at right hand in order to manipulate fasteners. Status at Eval: 7 lbs  Current Status: 8 lbs Progressing      Goal:*Patient will be able to demonstrate 3/5 MMT and move right UE through full ROM against gravity with no added resistance.   Status at Eval: 2/5 right shoulder flexion and abduction  Current Status: 2+/5 progressing    PLAN  [x]  Upgrade activities as tolerated     [x]  Continue plan of care  []  Update interventions per flow sheet       []  Discharge due to:_  []  Other:_      Bessy Lake OT 5/24/2018  12:16 PM

## 2018-05-25 ENCOUNTER — HOSPITAL ENCOUNTER (OUTPATIENT)
Dept: PHYSICAL THERAPY | Age: 58
Discharge: HOME | End: 2018-05-25
Payer: MEDICARE

## 2018-05-25 PROCEDURE — 97530 THERAPEUTIC ACTIVITIES: CPT

## 2018-05-25 PROCEDURE — 92507 TX SP LANG VOICE COMM INDIV: CPT

## 2018-05-25 PROCEDURE — 97110 THERAPEUTIC EXERCISES: CPT

## 2018-05-25 NOTE — PROGRESS NOTES
PT DAILY TREATMENT NOTE - [facility]     Patient Name: Maximilian Petit  Date:2018  : 1960  [x]  Patient  Verified  Payor: Bridgette Avila / Plan: [address] [place] [address] HMO / Product Type: Managed Care Medicare /    In time:11:30  Out time:12:10  Total Treatment Time (min): 40  Total Timed Codes (min): 40  1:1 Treatment Time ( [address] only): 40   Visit #: 20 of 24    Treatment Area: Nontraumatic intracerebral hemorrhage, unspecified [I61.9]  Other reduced mobility [Z74.09]    SUBJECTIVE  Pain Level (0-10 scale): 0/10  Any medication changes, allergies to medications, adverse drug reactions, diagnosis change, or new procedure performed?: [x] No    [] Yes (see summary sheet for update)  Subjective functional status/changes:   [] No changes reported  \"I don't have any pain today. I was tired after the last tx. My leg was heavy that night. \"    OBJECTIVE      30 min Therapeutic Exercise:  [x] See flow sheet :   Rationale: increase ROM, increase strength and improve coordination to improve the patients ability to return to prior level of physical activity. 10 min Therapeutic Activity:  [x]  See flow sheet :   Rationale: increase ROM, increase strength and improve coordination  to improve the patients ability to return to prior level of physical activity. With   [] TE   [] TA   [] neuro   [] other: Patient Education: [x] Review HEP    [] Progressed/Changed HEP based on:   [] positioning   [] body mechanics   [] transfers   [] heat/ice application    [] other:      Other Objective/Functional Measures:      Pain Level (0-10 scale) post treatment: 2/10    ASSESSMENT/Changes in Function: Pt tolerated therex well with increased fatigue but no pain. Pt required multiple rest breaks due to fatigue.      Patient will continue to benefit from skilled PT services to modify and progress therapeutic interventions, address functional mobility deficits, address ROM deficits, address strength deficits, analyze and address soft tissue restrictions, analyze and cue movement patterns, analyze and modify body mechanics/ergonomics and assess and modify postural abnormalities to attain remaining goals. []  See Plan of Care  []  See progress note/recertification  []  See Discharge Summary         Progress towards goals / Updated goals:  Pain Level (0-10 scale): Long Term Goals: To be accomplished in 3 weeks:                        NIDDHAZ will increase strength to 4/5 throughout B LEs to aid in return recreational activities and ADLs.                       FYGROL at IE: 3/5 throughout several planes in the R LE                        Current:4/5 in knee flex/ext, 3/5 in R hip flex progressing                            Patient will improve Tinetti score to 24 to demonstrate decreased risk for falls. (Kaylah Mclaughlin)                        DVFICL at IE:14                        Current: 25, Met 5/18/2018                            Patient will ambulate 1000ft on level surface with no AD. (UPDATED)                        IJOTHC at IE: 51ft                        Current:Pt able to amb 1000ft on level surface without AD but shows decreased step length with L LE                            Patient will improve FOTO to 45 points overall to demonstrate improvement in functional ability.                         AKHCFY at IE 16                        Current:45/100 Met 4/24/2018     PLAN  [x]  Upgrade activities as tolerated     [x]  Continue plan of care  []  Update interventions per flow sheet       []  Discharge due to:_  []  Other:_      Dharmesh Schulte PTA 5/25/2018  11:49 AM    Future Appointments  Date Time Provider Paul Lorenz   5/25/2018 12:30 PM Mallory Arenas [facility]   5/29/2018 10:30 AM Bessy Lake OT [facility]   5/29/2018 11:45 AM BESSY Ny [facility]   5/31/2018 1:30 PM Bessy Lake OT [facility]   5/31/2018 3:30 PM Dharmesh Schulte PTA [facility]   6/1/2018 9:00 AM Dharmesh Schulte PTA [facility]   6/1/2018 11:15 AM Belkys Abrams, BESSY [facility]

## 2018-05-25 NOTE — PROGRESS NOTES
ST DAILY TREATMENT NOTE    Patient Name: Vishal Sweeney  Date:2018  : 1960  [x]  Patient  Verified  Payor: Payor: Maicol [place] / Plan: [address] [place] [address] HMO / Product Type: Managed Care Medicare /   In time:10:40  Out time:11:20  Total Treatment Time (min): 40  Visit #: 2 of 12    Treatment Diagnosis: Dysarthria [R47.1]    SUBJECTIVE  Pain Level (0-10 scale): 0  Any medication changes, allergies to medications, adverse drug reactions, diagnosis change, or new procedure performed?: [x] No    [] Yes (see summary sheet for update)    Subjective functional status/changes:   [] No changes reported  Pt reported that family members have noticed improvements in her voice, especially on the phone. OBJECTIVE  Treatment provided includes:  Increase/Improve:  []  Voice Quality []  Cognitive Linguistic Skills []  Laryngeal/Pharyngeal Exercises   [x]  Vocal Loudness []  Reading Comprehension []  Swallowing Skills    []  Vocal Cord Function []  Auditory Comprehension []  Oral Motor Skills   []  Resonance []  Writing Skills []  Compensatory strategies    [x]  Speech Intelligibility []  Expressive Language []  Attention   []  Breath Support/Coord.  []  Receptive language []  Memory   []  Articulation []  Safety Awareness []    []  Fluency []  Word Retrieval []        Treatment Provided:  Pt produced CV combinations with 2 cues for increased speed paired with maintenance of speech precision  Pt produced sentences with an average of 72 dB independently; GOAL MET  Pt engaged in intelligbility drills with isolated 3-syllable words with 3 cues for over-articulation of consonant sounds     Patient/Caregiver  Education: [x] Review HEP      HEP/Handouts given: Continue HEP    Pain Level (0-10 scale) post treatment: 0    ASSESSMENT   []   Improving appropriately and progressing toward goals  [x]   Improving slowly and progressing toward goals  []   Approximating goals/maximum potential  [x]   Continues to benefit from skilled therapy to address remaining functional deficits  []   Not progressing toward goals and plan of care will be adjusted    Patient will continue to benefit from skilled therapy to address remaining functional deficits: Dysarthria    Progress towards goals / Updated goals:  Pt demonstrated improved vocal volume during structured and unstructured conversation this session. Pt demonstrated understanding of smooth, single initial breath prior to speaking, further improving her vocal intensity. PLAN  [x]  Continue plan of care  []  Modify Goals/Treatment Plan      []  Discharge due to:  [] Other:     Goals for this certification period to be accomplished in 6 weeks:  1) Pt will perform oral motor exercises targeting improved lingual strength and ROM with no more than 2 cues from SLP for speed in order to improve tongue strength for speech production. 2) Pt will engage in CV motor speech drills with varying consonants with no more than 2 cues for speed and precision of production from SLP. 5/25/18: 2 cues  3) Pt will read sentence-level material within the range of 70-73 dB in order to improve overall phonation/respiration coordination during lengthier utterances. 5/25/18: GOAL MET; Pt produced sentences with an average of 72 dB independently  4) Pt will engage in sentence-level intelligibility drills with no more than 2 cues from SLP for improved articulatory precision and speed and pitch maintenance.  5/25/2018: Pt engaged in intelligbility drills with isolated 3-syllable words with 3 cues for over-articulation of consonant sounds       BESSY Osuna 5/25/2018  10:40 AM    Future Appointments  Date Time Provider Paul Lorenz   5/25/2018 12:30 PM Mallory Jameson [facility]   5/29/2018 10:30 AM Brissa Alcantara OT [facility]   5/29/2018 11:45 AM BESSY Osuna [facility]   5/31/2018 1:30 PM Brissa Alcantara OT [facility]   5/31/2018 3:30 PM [facility] 6/1/2018 9:00 AM Emilie Mejia PTA [facility]   6/1/2018 11:15 AM BESSY Dominguez [facility]

## 2018-05-29 ENCOUNTER — HOSPITAL ENCOUNTER (OUTPATIENT)
Dept: PHYSICAL THERAPY | Age: 58
Discharge: HOME | End: 2018-05-29
Payer: MEDICARE

## 2018-05-29 PROCEDURE — 92507 TX SP LANG VOICE COMM INDIV: CPT

## 2018-05-29 PROCEDURE — 97110 THERAPEUTIC EXERCISES: CPT

## 2018-05-29 NOTE — PROGRESS NOTES
ST DAILY TREATMENT NOTE    Patient Name: Damien Kalia  Date:2018  : 1960  [x]  Patient  Verified  Payor: Payor: Pati Lucero / Plan: [address] [place] [address] HMO / Product Type: Managed Care Medicare /   In time:11:45  Out time:12:30  Total Treatment Time (min): 39  Visit #: 3 of 12    Treatment Diagnosis: Dysarthria [R47.1]    SUBJECTIVE  Pain Level (0-10 scale): 0  Any medication changes, allergies to medications, adverse drug reactions, diagnosis change, or new procedure performed?: [x] No    [] Yes (see summary sheet for update)    Subjective functional status/changes:   [] No changes reported      OBJECTIVE  Treatment provided includes:  Increase/Improve:  []  Voice Quality []  Cognitive Linguistic Skills []  Laryngeal/Pharyngeal Exercises   []  Vocal Loudness []  Reading Comprehension []  Swallowing Skills    []  Vocal Cord Function []  Auditory Comprehension []  Oral Motor Skills   []  Resonance []  Writing Skills []  Compensatory strategies    [x]  Speech Intelligibility []  Expressive Language []  Attention   []  Breath Support/Coord.  []  Receptive language []  Memory   [x]  Articulation []  Safety Awareness []    []  Fluency []  Word Retrieval []        Treatment Provided:  Pt engaged in unstructured conversation with improved articulatory precision    Patient/Caregiver  Education: [x] Review HEP      HEP/Handouts given: Continue HEP    Pain Level (0-10 scale) post treatment: 0    ASSESSMENT   []   Improving appropriately and progressing toward goals  [x]   Improving slowly and progressing toward goals  []   Approximating goals/maximum potential  [x]   Continues to benefit from skilled therapy to address remaining functional deficits  []   Not progressing toward goals and plan of care will be adjusted    Patient will continue to benefit from skilled therapy to address remaining functional deficits: Dysarthria    Progress towards goals / Updated goals:  Pt engaged in unstructured conversation with improved articulatory precision this session. She was observed to over-exaggerate articulation, especially during consonant production. PLAN  [x]  Continue plan of care  []  Modify Goals/Treatment Plan      []  Discharge due to:  [] Other:    Goals for this certification period to be accomplished in 6 weeks:  1) Pt will perform oral motor exercises targeting improved lingual strength and ROM with no more than 2 cues from SLP for speed in order to improve tongue strength for speech production. 2) Pt will engage in CV motor speech drills with varying consonants with no more than 2 cues for speed and precision of production from SLP. 5/25/18: 2 cues  3) Pt will read sentence-level material within the range of 70-73 dB in order to improve overall phonation/respiration coordination during lengthier utterances. 5/25/18: GOAL MET; Pt produced sentences with an average of 72 dB independently  4) Pt will engage in sentence-level intelligibility drills with no more than 2 cues from SLP for improved articulatory precision and speed and pitch maintenance.  5/25/2018: Pt engaged in intelligbility drills with isolated 3-syllable words with 3 cues for over-articulation of consonant sounds        BESSY Carlton 5/29/2018  11:45 AM    Future Appointments  Date Time Provider Paul Schultzi   5/31/2018 1:30 PM Antonio Mae OT [facility]   5/31/2018 3:30 PM Juanita Teague PTA [facility]   6/1/2018 9:00 AM Juanita Teague PTA [facility]   6/1/2018 11:15 AM BSESY Carlton [facility]

## 2018-05-31 ENCOUNTER — HOSPITAL ENCOUNTER (OUTPATIENT)
Dept: PHYSICAL THERAPY | Age: 58
Discharge: HOME | End: 2018-05-31
Payer: MEDICARE

## 2018-05-31 ENCOUNTER — HOSPITAL ENCOUNTER (OUTPATIENT)
Dept: PHYSICAL THERAPY | Age: 58
End: 2018-05-31
Payer: MEDICARE

## 2018-05-31 ENCOUNTER — APPOINTMENT (OUTPATIENT)
Dept: PHYSICAL THERAPY | Age: 58
End: 2018-05-31
Payer: MEDICARE

## 2018-05-31 PROCEDURE — 92507 TX SP LANG VOICE COMM INDIV: CPT

## 2018-05-31 PROCEDURE — 97110 THERAPEUTIC EXERCISES: CPT

## 2018-05-31 PROCEDURE — 97530 THERAPEUTIC ACTIVITIES: CPT

## 2018-05-31 NOTE — PROGRESS NOTES
ST DAILY TREATMENT NOTE    Patient Name: Maximo Natarajan  Date:2018  : 1960  [x]  Patient  Verified  Payor: Payor: [facility] / Plan: [address] [place] [address] HMO / Product Type: Managed Care Medicare /   In time:2:00  Out time:2:30  Total Treatment Time (min): 30  Visit #: 4 of 12    Treatment Diagnosis: Dysarthria [R47.1]    SUBJECTIVE  Pain Level (0-10 scale): 7 in neck  Any medication changes, allergies to medications, adverse drug reactions, diagnosis change, or new procedure performed?: [x] No    [] Yes (see summary sheet for update)    Subjective functional status/changes:   [] No changes reported  Pt reported severe right-sided neck pain this session  Pt reported difficulty concentrating when hearing several different sounds, difficulty hearing/understanding with right ear  \"I can understand myself better. \"    OBJECTIVE  Treatment provided includes:  Increase/Improve:  [x]  Voice Quality []  Cognitive Linguistic Skills []  Laryngeal/Pharyngeal Exercises   [x]  Vocal Loudness []  Reading Comprehension []  Swallowing Skills    []  Vocal Cord Function []  Auditory Comprehension []  Oral Motor Skills   []  Resonance []  Writing Skills []  Compensatory strategies    []  Speech Intelligibility []  Expressive Language []  Attention   []  Breath Support/Coord.  []  Receptive language []  Memory   []  Articulation []  Safety Awareness []    []  Fluency []  Word Retrieval []        Treatment Provided:  Pt engaged in conversation with an average of 66 dB     Patient/Caregiver  Education: [x] Review HEP      HEP/Handouts given: Continue HEP    Pain Level (0-10 scale) post treatment: 7 in neck    ASSESSMENT   []   Improving appropriately and progressing toward goals  []   Improving slowly and progressing toward goals  []   Approximating goals/maximum potential  []   Continues to benefit from skilled therapy to address remaining functional deficits  []   Not progressing toward goals and plan of care will be adjusted    Patient will continue to benefit from skilled therapy to address remaining functional deficits: Dysarthria     Progress towards goals / Updated goals: It is recommended that the Pt receive an assessment to determine auditory attention skills. Possible referral to an audiologist for thorough hearing assessment. PLAN  [x]  Continue plan of care  []  Modify Goals/Treatment Plan      []  Discharge due to:  [] Other:    Goals for this certification period to be accomplished in 6 weeks:  1) Pt will perform oral motor exercises targeting improved lingual strength and ROM with no more than 2 cues from SLP for speed in order to improve tongue strength for speech production. 2) Pt will engage in CV motor speech drills with varying consonants with no more than 2 cues for speed and precision of production from SLP. 5/25/18: 2 cues  3) Pt will read sentence-level material within the range of 70-73 dB in order to improve overall phonation/respiration coordination during lengthier utterances. 5/25/18: GOAL MET; Pt produced sentences with an average of 72 dB independently  4) Pt will engage in sentence-level intelligibility drills with no more than 2 cues from SLP for improved articulatory precision and speed and pitch maintenance.  5/25/2018: Pt engaged in intelligbility drills with isolated 3-syllable words with 3 cues for over-articulation of consonant sounds        BESSY Rivers 5/31/2018  1:56 PM    Future Appointments  Date Time Provider Paul Lorenz   5/31/2018 2:00 PM BESSY Rivers [facility]   5/31/2018 3:30 PM Xi Martinez [facility]   6/1/2018 9:00 AM Lita Morales PTA [facility]   6/1/2018 11:15 AM BESSY Rivers [facility]

## 2018-05-31 NOTE — PROGRESS NOTES
PT DAILY TREATMENT NOTE - [facility]     Patient Name: Deepak Tavera  Date:2018  : 1960  [x]  Patient  Verified  Payor: Alonso Ibarramanisha / Plan: [address] [place] [address] HMO / Product Type: Managed Care Medicare /    In time:3:00  Out time:3:53  Total Treatment Time (min): 53  Total Timed Codes (min): 53  1:1 Treatment Time ( [address] only): 48   Visit #: 21 of 24    Treatment Area: Nontraumatic intracerebral hemorrhage, unspecified [I61.9]  Other reduced mobility [Z74.09]    SUBJECTIVE  Pain Level (0-10 scale): 0/10  Any medication changes, allergies to medications, adverse drug reactions, diagnosis change, or new procedure performed?: [x] No    [] Yes (see summary sheet for update)  Subjective functional status/changes:   [] No changes reported  \"I feel alright. I had a little headache earlier but it's gone. \"    OBJECTIVE      30 min Therapeutic Exercise:  [x] See flow sheet :   Rationale: increase ROM, increase strength and improve coordination to improve the patients ability to return to prior level of physical activity. 23 min Therapeutic Activity:  [x]  See flow sheet :   Rationale: increase ROM, increase strength and improve coordination  to improve the patients ability to perform ADLs with less pain. With   [] TE   [] TA   [] neuro   [] other: Patient Education: [x] Review HEP    [] Progressed/Changed HEP based on:   [] positioning   [] body mechanics   [] transfers   [] heat/ice application    [] other:      Other Objective/Functional Measures:      Pain Level (0-10 scale) post treatment: 0/10    ASSESSMENT/Changes in Function: Pt continues to be challenged by strengthening therex. Pt was greatly fatigued by supine hip and stair amb therex. Pt required multiple rest breaks between ex.      Patient will continue to benefit from skilled PT services to modify and progress therapeutic interventions, address functional mobility deficits, address ROM deficits, address strength deficits, analyze and address soft tissue restrictions, analyze and cue movement patterns, analyze and modify body mechanics/ergonomics and assess and modify postural abnormalities to attain remaining goals. []  See Plan of Care  []  See progress note/recertification  []  See Discharge Summary         Progress towards goals / Updated goals:  Pain Level (0-10 scale): Long Term Goals: To be accomplished in 3 weeks:                        CCDBQVA will increase strength to 4/5 throughout B LEs to aid in return recreational activities and ADLs.                       RXIMOV at IE: 3/5 throughout several planes in the R LE                        Current:4/5 in knee flex/ext, 3/5 in R hip flex progressing                            Patient will improve Tinetti score to 24 to demonstrate decreased risk for falls. (Maggie Jaramillo)                        HZWYNP at IE:14                        Current: 25, Met 5/18/2018                            Patient will ambulate 1000ft on level surface with no AD. (UPDATED)                        AUWCAW at IE: 51ft                        Current:Pt able to amb 1000ft on level surface without AD but shows decreased step length with L LE                            Patient will improve FOTO to 45 points overall to demonstrate improvement in functional ability.                         FBXMHL at IE 16                        Current:45/100 Met 4/24/2018        PLAN  [x]  Upgrade activities as tolerated     [x]  Continue plan of care  []  Update interventions per flow sheet       []  Discharge due to:_  []  Other:_      Agustin Miranda PTA 5/31/2018  3:35 PM    Future Appointments  Date Time Provider Paul Lorenz   6/1/2018 9:00 AM Agustin Miranda PTA [facility]   6/1/2018 11:15 AM Debbie Gtz SLP [facility]

## 2018-05-31 NOTE — PROGRESS NOTES
OT DAILY TREATMENT NOTE - [facility]     Patient Name: Deepak Tavera  Date:2018  : 1960  [x]  Patient  Verified  Payor: Alonso Acosta / Plan: [address] [place] [address] HMO / Product Type: Managed Care Medicare /    In time:130  Out time:200  Total Treatment Time (min): 30  Total Timed Codes (min): 30  1:1 Treatment Time ( [address] only): 30   Visit #: 12 of 12    Treatment Area: Nontraumatic intracerebral hemorrhage, unspecified [I61.9]  Other reduced mobility [Z74.09]    SUBJECTIVE  Pain Level (0-10 scale): 0  Any medication changes, allergies to medications, adverse drug reactions, diagnosis change, or new procedure performed?: [x] No    [] Yes (see summary sheet for update)  Subjective functional status/changes:   [] No changes reported  \"I feel good Im still working on all of these. \"    OBJECTIVE  30 min Therapeutic Exercise:  [x] See flow sheet :   Rationale: increase ROM, increase strength and improve coordination to improve the patients ability to perform ADLs and IADLs. With   [x] TE   [] TA   [] neuro   [] other: Patient Education: [x] Review HEP    [] Progressed/Changed HEP based on:   [] positioning   [] body mechanics   [] transfers   [] heat/ice application   [] Splint wear/care   [] Sensory re-education   [] scar management      [] other:           Pain Level (0-10 scale) post treatment: 0/10    ASSESSMENT/Changes in Function: Patient working on her in hand manipulation today w pennies, delayed timing and speed overall. Continuing UB strengthening at home. Patient will continue to benefit from skilled OT services to address ROM deficits, address strength deficits and analyze and address soft tissue restrictions to attain remaining goals. [x]  See Plan of Care  []  See progress note/recertification  []  See Discharge Summary         Progress towards goals / Updated goals:  Short Term Goals:  To be accomplished in 2  weeks:  Goal:* Patient will be compliant with initial home exercise program to take an active role in their rehabilitation process. Status at eval:   HEP initiated  Current Status:  Patient continuing HEP MET      Long Term Goals: To be accomplished in 4 weeks:  Goal:*Patient will increase  strength by at least 10 lbs at right hand in order to carry a small shopping bag. Status at Eval: 4 lbs   Current status: 9 lbs Progressing        Goal:*Patient will increase pinch strength by at least 3 lbs at right hand in order to manipulate fasteners. Status at Eval: 7 lbs  Current Status: 8 lbs Progressing      Goal:*Patient will be able to demonstrate 3/5 MMT and move right UE through full ROM against gravity with no added resistance.   Status at Eval: 2/5 right shoulder flexion and abduction  Current Status: 2+/5 progressing    PLAN  [x]  Upgrade activities as tolerated     [x]  Continue plan of care  []  Update interventions per flow sheet       []  Discharge due to:_  []  Other:_      Ricih Campbell OT 5/31/2018  1:51 PM

## 2018-06-01 ENCOUNTER — APPOINTMENT (OUTPATIENT)
Dept: PHYSICAL THERAPY | Age: 58
End: 2018-06-01
Payer: MEDICARE

## 2018-06-19 ENCOUNTER — HOSPITAL ENCOUNTER (OUTPATIENT)
Dept: PHYSICAL THERAPY | Age: 58
Discharge: HOME | End: 2018-06-19
Payer: MEDICARE

## 2018-06-19 PROCEDURE — G8978 MOBILITY CURRENT STATUS: HCPCS | Performed by: PHYSICAL THERAPIST

## 2018-06-19 PROCEDURE — 97110 THERAPEUTIC EXERCISES: CPT | Performed by: PHYSICAL THERAPIST

## 2018-06-19 PROCEDURE — G8979 MOBILITY GOAL STATUS: HCPCS | Performed by: PHYSICAL THERAPIST

## 2018-06-19 PROCEDURE — 97530 THERAPEUTIC ACTIVITIES: CPT | Performed by: PHYSICAL THERAPIST

## 2018-06-19 NOTE — PROGRESS NOTES
PT DAILY TREATMENT NOTE - [facility]     Patient Name: Johanny Do  Date:2018  : 1960  [x]  Patient  Verified  Payor: Vicky Heath / Plan: [address] [place] [address] HMO / Product Type: Managed Care Medicare /    In time:10:30  Out time:11:14  Total Treatment Time (min): 44  Total Timed Codes (min): 44  1:1 Treatment Time ( [address] only): 40   Visit #: 22 of 24     Treatment Area: Nontraumatic intracerebral hemorrhage, unspecified [I61.9]  Other reduced mobility [Z74.09]    SUBJECTIVE  Pain Level (0-10 scale): 0  Any medication changes, allergies to medications, adverse drug reactions, diagnosis change, or new procedure performed?: [x] No    [] Yes (see summary sheet for update)  Subjective functional status/changes:   [] No changes reported  Feels alright. Just couldn't make it in the past few weeks because of daughters schedules. OBJECTIVE      30 min Therapeutic Exercise:  [x] See flow sheet :   Rationale: increase ROM, increase strength and improve balance to improve the patients ability to complete ADLs    14 min Therapeutic Activity:  [x]  See flow sheet :   Rationale: increase ROM, increase strength, improve coordination and increase proprioception  to improve the patients ability to complete ADLs         With   [] TE   [] TA   [] neuro   [] other: Patient Education: [x] Review HEP    [] Progressed/Changed HEP based on:   [] positioning   [] body mechanics   [] transfers   [] heat/ice application    [] other:      Other Objective/Functional Measures:   ROM/Strength       Strength (1-5)  Hip Left Right   Flexion 5 3-   Extension 5 3-   Abduction 5 4   Knee Left Right   Extension 5 4   Flexion 5 5   Ankle Left Right   Plantar Flexion 5 4   Dorsiflexion 5 4         Pain Level (0-10 scale) post treatment: 0    ASSESSMENT/Changes in Function: Patient responds well to treatment session. Patient returns to clinic after several week absence due to scheduling difficulties.  Has minimal progress as expected given absence from clinic. Will continue to benefit from skilled PT services to address remaining deficits as noted below. No adverse effects were noted from today's treatment session      Patient will continue to benefit from skilled PT services to modify and progress therapeutic interventions, address functional mobility deficits, address ROM deficits, address strength deficits, analyze and cue movement patterns, analyze and modify body mechanics/ergonomics, assess and modify postural abnormalities, address imbalance/dizziness and instruct in home and community integration to attain remaining goals. []  See Plan of Care  [x]  See progress note/recertification  []  See Discharge Summary         Progress towards goals / Updated goals:  Long Term Goals: To be accomplished in 3 weeks:                        Patient will increase strength to 5/5 throughout B LEs to aid in return recreational activities and ADLs. (UPDATED)                        OCZKVU at IE: 3/5 throughout several planes in the R LE                        Current:4/5 in knee flex/ext, 3/5 in R hip flex progressing                            Patient will improve Tinetti score to 24 to demonstrate decreased risk for falls. (Krista Chowdary)                        GIQCXL at IE:14                        Current: 25, Met 5/18/2018                            Patient will ambulate 1000ft on level surface with no AD. (UPDATED)                        GDKOZH at IE: 51ft                        Current:Pt able to amb 1000ft on level surface without AD but shows decreased step length with L LE                            Patient will improve FOTO to 45 points overall to demonstrate improvement in functional ability.                         HMZVOW at IE 12                        Current:45/100 Met 4/24/2018     PLAN  []  Upgrade activities as tolerated     [x]  Continue plan of care  []  Update interventions per flow sheet       []  Discharge due to:_  [] Other:_      Dima Gallo PT, DPT 6/19/2018  10:50 AM    Future Appointments  Date Time Provider Paul Lorenz   6/21/2018 11:00 AM BESSY Olivarez [facility]   6/22/2018 10:30 AM Dima Gallo PT, DPT [facility]   6/22/2018 11:15 AM BESSY Olivarez [facility]   6/26/2018 10:30 AM Marisela Foss, OTR/L [facility]   6/26/2018 11:00 AM Hira Shelton PTA [facility]   6/26/2018 12:15 PM BESSY Olivarez [facility]   6/28/2018 11:00 AM Marisela Foss, OTR/L [facility]   6/28/2018 11:45 AM BESSY Olivarez [facility]   6/28/2018 12:30 PM Hira Shelton PTA [facility]

## 2018-06-20 NOTE — PROGRESS NOTES
[facility]  [address], [address]  Ph [phone]  Fx [phone]    Continued Plan of Care/ Re-certification for Physical Therapy Services    Patient name: Faina Weaver Start of Care: 3/1/2018   Referral source: Jonathan Yancey MD : 1960   Medical/Treatment Diagnosis: Nontraumatic intracerebral hemorrhage, unspecified [I61.9]  Other reduced mobility [Z74.09] Onset Date:2017     Prior Hospitalization: see medical history Provider#: [id number]   Medications: Verified on Patient Summary List    Comorbidities:  hx CA in R eye (), blind in R eye, CVA 2017, HTN, A-fib  Prior Level of Function:independent w/ ADLs    Visits from Start of Care: 22    Missed Visits: 4    The Plan of Care and following information is based on the patient's current status:      Key functional changes: NA, due to absence from clinic      Problems/ barriers to goal attainment: NA     Problem List: decrease ROM, decrease strength, impaired gait/ balance, decrease ADL/ functional abilitiies, decrease activity tolerance and decrease transfer abilities    Treatment Plan: Therapeutic exercise, Therapeutic activities, Neuromuscular re-education, Physical agent/modality, Gait/balance training, Manual therapy, Patient education, Functional mobility training, Home safety training and Stair training       Goals for this certification period to be accomplished in 4 weeks:  Long Term Goals: To be accomplished in 4 weeks:                        Patient will increase strength to 5/5 throughout B LEs to aid in return recreational activities and ADLs. (UPDATED)                        TPCCYE at IE: 3/5 throughout several planes in the R LE                        Current:4/5 in knee flex/ext, 3/5 in R hip flex progressing                            Patient will improve Tinetti score to 24 to demonstrate decreased risk for falls.  (UPDATED)                        FNWQFZ at IE:14                        Current: 25, Met 5/18/2018                            Patient will ambulate 1000ft on level surface with no AD. (UPDATED)                        TWTFVB at IE: 51ft                        Current:Pt able to amb 1000ft on level surface without AD but shows decreased step length with L LE                            Patient will improve FOTO to 45 points overall to demonstrate improvement in functional ability.                       GBTPID at IE 16                        Current:45/100 Met 4/24/2018     Frequency / Duration: Patient to be seen 3 times per week for 4 weeks:    Assessment / Recommendations:Patient responds well to treatment session. Patient returns to clinic after several week absence due to scheduling difficulties. Has minimal progress as expected given absence from clinic. Will continue to benefit from skilled PT services to address remaining deficits as noted below. No adverse effects were noted from today's treatment session        Patient will continue to benefit from skilled PT services to modify and progress therapeutic interventions, address functional mobility deficits, address ROM deficits, address strength deficits, analyze and cue movement patterns, analyze and modify body mechanics/ergonomics, assess and modify postural abnormalities, address imbalance/dizziness and instruct in home and community integration to attain remaining goals. G-Codes (GP)  Mobility  [id number] Current  CK= 40-59%  [id number] Goal  CK= 40-59%    The severity rating is based on clinical judgment and the FOTO score. Certification Period: 6/19/2018 - 7/17/2018    Lundy Severin, PT, DPT 6/20/2018 8:09 AM    ________________________________________________________________________  I certify that the above Therapy Services are being furnished while the patient is under my care. I agree with the treatment plan and certify that this therapy is necessary.     [] I have read the above and request that my patient continue as recommended.   [] I have read the above report and request that my patient continue therapy with the following changes/special instructions: _______________________________________  [] I have read the above report and request that my patient be discharged from therapy    Physician's Signature:________________________________Date:___________Time:__________    Please sign and return to [facility]  [address], [address]  Ph [phone]  Fx [phone]

## 2018-06-21 ENCOUNTER — HOSPITAL ENCOUNTER (OUTPATIENT)
Dept: PHYSICAL THERAPY | Age: 58
Discharge: HOME | End: 2018-06-21
Payer: MEDICARE

## 2018-06-21 PROCEDURE — G9186 MOTOR SPEECH GOAL STATUS: HCPCS

## 2018-06-21 PROCEDURE — 92507 TX SP LANG VOICE COMM INDIV: CPT

## 2018-06-21 PROCEDURE — G8999 MOTOR SPEECH CURRENT STATUS: HCPCS

## 2018-06-21 NOTE — PROGRESS NOTES
ST DAILY TREATMENT NOTE    Patient Name: Shalonda Francois  Date:2018  : 1960  [x]  Patient  Verified  Payor: Payor: Sunitha Amanda / Plan: [address] [place] [address] HMO / Product Type: Managed Care Medicare /   In time:11:00  Out time:11:50  Total Treatment Time (min): 50  Visit #: 1 of 12    Treatment Diagnosis: Dysarthria [R47.1]    SUBJECTIVE  Pain Level (0-10 scale): 0  Any medication changes, allergies to medications, adverse drug reactions, diagnosis change, or new procedure performed?: [x] No    [] Yes (see summary sheet for update)    Subjective functional status/changes:   [] No changes reported  Pt reported that she feels she has made progress; She is bothered that it'd difficult to play bingo due to scanning difficulty; She reported continued difficulty hearing. OBJECTIVE  Treatment provided includes:  Increase/Improve:  [x]  Voice Quality []  Cognitive Linguistic Skills []  Laryngeal/Pharyngeal Exercises   [x]  Vocal Loudness []  Reading Comprehension []  Swallowing Skills    []  Vocal Cord Function []  Auditory Comprehension []  Oral Motor Skills   []  Resonance []  Writing Skills []  Compensatory strategies    []  Speech Intelligibility []  Expressive Language []  Attention   []  Breath Support/Coord.  []  Receptive language []  Memory   []  Articulation []  Safety Awareness []    []  Fluency []  Word Retrieval []        Treatment Provided:  Pt engaged in conversational speech with improved vocal intensity, especially during moments of excitement    Patient/Caregiver  Education: [x] Review HEP      HEP/Handouts given: Continue HEP    Pain Level (0-10 scale) post treatment: 0    ASSESSMENT   []   Improving appropriately and progressing toward goals  [x]   Improving slowly and progressing toward goals  []   Approximating goals/maximum potential  [x]   Continues to benefit from skilled therapy to address remaining functional deficits  []   Not progressing toward goals and plan of care will be adjusted    Patient will continue to benefit from skilled therapy to address remaining functional deficits: Dysarthria    Progress towards goals / Updated goals: It is recommended that the Pt continue to receive skilled speech therapy to address deficits related to Dysarthria. Therapy targets will include sentence-level and conversational vocal intensity, improved lingual strength, and conversational speech intelligibility. Pt has also shared some concerns with swallowing liquids (coughing episode while eating soup). SLP provided Pt with a list of exercises to perform to strengthen her ability to tolerate liquids in the home. Additionally, it is recommended that Pt is evaluated by an audiologist as she continues to report difficulty hearing out of right ear.        PLAN  [x]  Continue plan of care  []  Modify Goals/Treatment Plan      []  Discharge due to:  [] Other:    Goals for this certification period to be accomplished in 6 weeks:  1) Pt will perform oral motor exercises targeting improved lingual strength and ROM with no more than 2 cues from SLP for speed in order to improve tongue strength for speech production. 2) Pt will engage in CV motor speech drills with varying consonants with no more than 2 cues for speed and precision of production from SLP. 3) Pt will read sentence-level material within the range of 70-73 dB over 2 consecutive sessions in order to improve overall phonation/respiration coordination during lengthier utterances. 4) Pt will engage in a bingo game to encourage visual scanning of the board with no more than 2 cues from SLP in order to improve her ability to attend enjoyable bingo games in the community.     BESSY Machuca 6/21/2018  11:01 AM    Future Appointments  Date Time Provider Paul Lorenz   6/22/2018 10:30 AM Eleno Coleman, PT, DPT [address] [facility]   6/22/2018 11:15 AM BESSY Machuca [address] [facility]   6/26/2018 10:30 AM [facility] OT AT [facility] [address] [facility]   6/26/2018 11:00 AM Laurel Bello [facility]   6/26/2018 11:45 AM [facility]ESSY Ji [facility]   6/28/2018 11:00 AM [facility]   6/28/2018 11:45 AM BESSY Ji [facility]   6/28/2018 12:30 PM Sweta Galeas PTA [facility]

## 2018-06-21 NOTE — PROGRESS NOTES
[facility]  [address] [place], [address]  Ph [phone]  Fx [phone]    Continued Plan of Care/ Re-certification for Speech Therapy Services    Patient name: Karlee Khan Start of Care: 3/9/2018   Referral source: Ulices Adams MD : 1960   Medical/Treatment Diagnosis: Dysarthria [R47.1] Onset Date:2017     Prior Hospitalization: see medical history Provider#: [id number]   Medications: Verified on Patient Summary List    Comorbidities: Depression, High Blood Pressure, Visual Impairements  Prior Level of Function:Independent prior to CVA  Visits from Start of Care: 14    Missed Visits: 3    The Plan of Care and following information is based on the patient's current status:  Goal: Pt will perform oral motor exercises targeting improved lingual strength and ROM with no more than 2 cues from SLP for speed in order to improve tongue strength for speech production. Status at evaluation: Weak, imprecise articulation caused by decreased lingual and labial strength  Status at last note/certification: Pt demonstrates decreased tongue strength and speed  Current Status: Progressing; Pt is requiring fading cues for maximum effort during exercises     Goal: Pt will engage in CV motor speech drills with varying consonants with no more than 2 cues for speed and precision of production from SLP. Status at last note/certification: 3 cues  Current Status: Progressing; 2 cues; Continue for consistency due to Pt's absence    Goal: Pt will read sentence-level material within the range of 70-73 dB in order to improve overall phonation/respiration coordination during lengthier utterances. Status at last note/certification: 18-01 dB  Current Status: Progressing; 66-76 dB;  Continue for consistency due to Pt's absence     Goal: Pt will engage in sentence-level intelligibility drills with no more than 2 cues from SLP for improved articulatory precision and speed and pitch maintenance. Status at last note/certification: Cues for over-articulation of sounds  Current Status: GOAL MET; 1 cue    Key functional changes: Pt has demonstrated improved vocal intensity during sentence production, paired with improved breath support. She continues to require fading cues during speech intelligibility tasks. Problems/ barriers to goal attainment: Frequent absences     Problem List:      []aphasic  [x]dysarthric  []dysphagic       []alexic  []agraphic  []dysphonia       []dysfluency  []Cognitive-Linguistic Disorder       []other     Treatment Plan: Oral Motor Therapeutic Excerise, Dysarthria Treatment and Patient Education    Patient Goal (s) has been updated and includes: Continued Dysarthria treatment focusing on improved vocal intensity and conversational speech intelligibility, Visual scanning due to vision difficulty after stroke    Goals for this certification period to be accomplished in 6 weeks:  1) Pt will perform oral motor exercises targeting improved lingual strength and ROM with no more than 2 cues from SLP for speed in order to improve tongue strength for speech production. 2) Pt will engage in CV motor speech drills with varying consonants with no more than 2 cues for speed and precision of production from SLP. 3) Pt will read sentence-level material within the range of 70-73 dB over 2 consecutive sessions in order to improve overall phonation/respiration coordination during lengthier utterances. 4) Pt will engage in a bingo game to encourage visual scanning of the board with no more than 2 cues from SLP in order to improve her ability to attend enjoyable bingo games in the community.     Frequency / Duration: Patient to be seen 2 times per week for 6 weeks:    Assessment/Recommendations: It is recommended that the Pt continue to receive skilled speech therapy to address deficits related to Dysarthria.  Therapy targets will include sentence-level and conversational vocal intensity, improved lingual strength, and conversational speech intelligibility. Pt has also shared some concerns with swallowing liquids (coughing episode while eating soup). SLP provided Pt with a list of exercises to perform to strengthen her ability to tolerate liquids in the home. Additionally, it is recommended that Pt is evaluated by an audiologist as she continues to report difficulty hearing out of right ear. G Codes: Motor:  Current  CJ= 20-39%   Goal  CI= 1-19%      The severity rating is based on the following outcomes:    National Outcomes Measures (NOMS), Pt progress, and professional judgement    Certification Period: 6/21/2018 to 7/19/2018     Wendel Lesches, SLP 6/21/2018 10:35 AM    _____________________________________________________________________    I certify that the above Therapy Services are being furnished while the patient is under my care. I agree with the treatment plan and certify that this therapy is necessary. []  I have read the above report and request that my patient continue as recommended. []  I have read the above report and request that my patient continue therapy with the following changes/special instructions:________________________________________  []I have read the above report and request that my patient be discharged from therapy.     Physician's Signature:_________________ Date:___________Time:__________      Please sign and return to   [facility]  [address], [address] [facility] [address]              Ph [phone]  Fx [phone]

## 2018-06-22 ENCOUNTER — HOSPITAL ENCOUNTER (OUTPATIENT)
Dept: PHYSICAL THERAPY | Age: 58
Discharge: HOME | End: 2018-06-22
Payer: MEDICARE

## 2018-06-22 PROCEDURE — 92507 TX SP LANG VOICE COMM INDIV: CPT

## 2018-06-22 PROCEDURE — 97110 THERAPEUTIC EXERCISES: CPT | Performed by: PHYSICAL THERAPIST

## 2018-06-22 PROCEDURE — 97530 THERAPEUTIC ACTIVITIES: CPT | Performed by: PHYSICAL THERAPIST

## 2018-06-22 NOTE — PROGRESS NOTES
ST DAILY TREATMENT NOTE    Patient Name: Nya López  Date:2018  : 1960  [x]  Patient  Verified  Payor: Payor: Jerilynn Canavan / Plan: [address] [place] [address] HMO / Product Type: Managed Care Medicare /   In time:11:15  Out time:12:00  Total Treatment Time (min): 39  Visit #: 2 of 12    Treatment Diagnosis: Dysarthria [R47.1]    SUBJECTIVE  Pain Level (0-10 scale): 0  Any medication changes, allergies to medications, adverse drug reactions, diagnosis change, or new procedure performed?: [x] No    [] Yes (see summary sheet for update)    Subjective functional status/changes:   [] No changes reported  Pt reported that she continues to experience difficulty hearing and it impacts conversations with her daughters. OBJECTIVE  Treatment provided includes:  Increase/Improve:  []  Voice Quality []  Cognitive Linguistic Skills []  Laryngeal/Pharyngeal Exercises   []  Vocal Loudness []  Reading Comprehension []  Swallowing Skills    []  Vocal Cord Function []  Auditory Comprehension []  Oral Motor Skills   []  Resonance []  Writing Skills []  Compensatory strategies    []  Speech Intelligibility []  Expressive Language []  Attention   []  Breath Support/Coord.  []  Receptive language []  Memory   []  Articulation []  Safety Awareness []    []  Fluency []  Word Retrieval []        Treatment Provided:  Pt engaged in a [employer] game with 0 cues for visually scanning from left to right    Patient/Caregiver  Education: [x] Review HEP      HEP/Handouts given: Continue HEP    Pain Level (0-10 scale) post treatment: 0    ASSESSMENT   []   Improving appropriately and progressing toward goals  [x]   Improving slowly and progressing toward goals  []   Approximating goals/maximum potential  [x]   Continues to benefit from skilled therapy to address remaining functional deficits  []   Not progressing toward goals and plan of care will be adjusted    Patient will continue to benefit from skilled therapy to address remaining functional deficits: Dysarthria, Visual scanning    Progress towards goals / Updated goals:  Pt demonstrated improved articulatory precision this date, as well as vocal volume. Pt reported that she has always been soft-spoken, so she does not wish to work on vocal volume as a goal anymore. PLAN  [x]  Continue plan of care  []  Modify Goals/Treatment Plan      []  Discharge due to:  [] Other:    Goals for this certification period to be accomplished in 6 weeks:  1) Pt will perform oral motor exercises targeting improved lingual strength and ROM with no more than 2 cues from SLP for speed in order to improve tongue strength for speech production. 2) Pt will engage in CV motor speech drills with varying consonants with no more than 2 cues for speed and precision of production from SLP. 3) Pt will read sentence-level material within the range of 70-73 dB over 2 consecutive sessions in order to improve overall phonation/respiration coordination during lengthier utterances. 4) Pt will engage in a bingo game to encourage visual scanning of the board with no more than 2 cues from SLP in order to improve her ability to attend enjoyable bingo games in the community.  6/22/2018: Pt engaged in a bingo game with 0 cues for visually scanning from left to right    BESSY Maya 6/22/2018  11:16 AM    Future Appointments  Date Time Provider Paul Lorenz   6/26/2018 10:30 AM [address] [facility]   6/26/2018 11:00 AM Josefina Park PTA [facility]   6/26/2018 11:45 AM BESSY Maya [facility]   6/28/2018 11:00 AM THE [facility] OT AT [facility] CTR [facility]   6/28/2018 11:45 AM BESSY Maya [facility]   6/28/2018 12:30 PM Josefina Park PTA [facility]

## 2018-06-22 NOTE — PROGRESS NOTES
PT DAILY TREATMENT NOTE - [facility]     Patient Name: Vincent Esquivel  Date:2018  : 1960  [x]  Patient  Verified  Payor: Fara Huber / Plan: [address] [place] [address] HMO / Product Type: Managed Care Medicare /    In time:10:35  Out time:11:14  Total Treatment Time (min): 39  Total Timed Codes (min): 39  1:1 Treatment Time ( [address] only): 44   Visit #: 23 of 39    Treatment Area: Nontraumatic intracerebral hemorrhage, unspecified [I61.9]  Other reduced mobility [Z74.09]    SUBJECTIVE  Pain Level (0-10 scale): 0  Any medication changes, allergies to medications, adverse drug reactions, diagnosis change, or new procedure performed?: [x] No    [] Yes (see summary sheet for update)  Subjective functional status/changes:   [] No changes reported  Feels alright. No new issues. OBJECTIVE    30 min Therapeutic Exercise:  [x] See flow sheet :   Rationale: increase ROM and increase strength to improve the patients ability to complete ADLs    9 min Therapeutic Activity:  [x]  See flow sheet :   Rationale: increase strength and improve coordination  to improve the patients ability to complete ADLs         With   [] TE   [] TA   [] neuro   [] other: Patient Education: [x] Review HEP    [] Progressed/Changed HEP based on:   [] positioning   [] body mechanics   [] transfers   [] heat/ice application    [] other:        Pain Level (0-10 scale) post treatment: 0    ASSESSMENT/Changes in Function: Patient responds well to treatment session. Patient is challenged by squats and seated marching. Progress as tolerated with strengthening exercises.  No adverse effects were noted from today's treatment session      Patient will continue to benefit from skilled PT services to modify and progress therapeutic interventions, address functional mobility deficits, address ROM deficits, address strength deficits, analyze and address soft tissue restrictions, analyze and cue movement patterns, analyze and modify body mechanics/ergonomics and assess and modify postural abnormalities to attain remaining goals. []  See Plan of Care  []  See progress note/recertification  []  See Discharge Summary         Progress towards goals / Updated goals:  Long Term Goals: To be accomplished in 4 weeks:                        Patient will increase strength to 5/5 throughout B LEs to aid in return recreational activities and ADLs. (UPDATED)                        CROLNV at IE: 3/5 throughout several planes in the R LE                        Current:4/5 in knee flex/ext, 3/5 in R hip flex progressing                            Patient will improve Tinetti score to 24 to demonstrate decreased risk for falls. (Michelle Rae)                        YGBHSM at IE:14                        Current: 25, Met 5/18/2018                            Patient will ambulate 1000ft on level surface with no AD. (UPDATED)                        OCHZGX at IE: 51ft                        Current:Pt able to amb 1000ft on level surface without AD but shows decreased step length with L LE                            Patient will improve FOTO to 45 points overall to demonstrate improvement in functional ability.                         JAFMOQ at IE 16                        Current:45/100 Met 4/24/2018     PLAN  []  Upgrade activities as tolerated     [x]  Continue plan of care  []  Update interventions per flow sheet       []  Discharge due to:_  []  Other:_      Luis A Ospina, PT, DPT 6/22/2018  10:53 AM    Future Appointments  Date Time Provider Paul Lorenz   6/22/2018 11:15 AM BESSY Redmond [facility]   6/26/2018 10:30 AM [facility] OT AT [facility]   6/26/2018 11:00 AM Valentin Jarvis PTA [facility]   6/26/2018 11:45 AM BESSY Redmond [facility]   6/28/2018 11:00 AM THE [facility] OT AT [facility]   6/28/2018 11:45 AM BESSY Redmond [facility]   6/28/2018 12:30 PM Valentin Jarvis PTA [facility]

## 2018-06-26 ENCOUNTER — HOSPITAL ENCOUNTER (OUTPATIENT)
Dept: PHYSICAL THERAPY | Age: 58
Discharge: HOME | End: 2018-06-26
Payer: MEDICARE

## 2018-06-26 ENCOUNTER — APPOINTMENT (OUTPATIENT)
Dept: PHYSICAL THERAPY | Age: 58
End: 2018-06-26
Payer: MEDICARE

## 2018-06-26 PROCEDURE — 97110 THERAPEUTIC EXERCISES: CPT

## 2018-06-26 PROCEDURE — 97168 OT RE-EVAL EST PLAN CARE: CPT

## 2018-06-26 PROCEDURE — 97530 THERAPEUTIC ACTIVITIES: CPT

## 2018-06-26 NOTE — PROGRESS NOTES
ST DAILY TREATMENT NOTE    Patient Name: Daija Luciano  Date:2018  : 1960  [x]  Patient  Verified  Payor: Payor: Yina Morley / Plan: [address] [place] [address] HMO / Product Type: Managed Care Medicare /   In time:11:45  Out time:12:30  Total Treatment Time (min): 39  Visit #: 3 of 12    Treatment Diagnosis: Dysarthria [R47.1]    SUBJECTIVE  Pain Level (0-10 scale): 0  Any medication changes, allergies to medications, adverse drug reactions, diagnosis change, or new procedure performed?: [x] No    [] Yes (see summary sheet for update)    Subjective functional status/changes:   [x] No changes reported      OBJECTIVE  Treatment provided includes:  Increase/Improve:  []  Voice Quality []  Cognitive Linguistic Skills []  Laryngeal/Pharyngeal Exercises   []  Vocal Loudness []  Reading Comprehension []  Swallowing Skills    []  Vocal Cord Function []  Auditory Comprehension []  Oral Motor Skills   []  Resonance []  Writing Skills []  Compensatory strategies    []  Speech Intelligibility []  Expressive Language []  Attention   []  Breath Support/Coord. []  Receptive language []  Memory   [x]  Articulation []  Safety Awareness []    []  Fluency []  Word Retrieval []        Treatment Provided:  Pt engaged in CV motor speech drills with 0 cues; GOAL MET  Pt produced 3-syllable words with 0 cues for over-emphasizing the sounds;  Pt observed to produce words with a slow rate    Patient/Caregiver  Education: [x] Review HEP      HEP/Handouts given: Continue HEP    Pain Level (0-10 scale) post treatment: 0    ASSESSMENT   [x]   Improving appropriately and progressing toward goals  []   Improving slowly and progressing toward goals  []   Approximating goals/maximum potential  [x]   Continues to benefit from skilled therapy to address remaining functional deficits  []   Not progressing toward goals and plan of care will be adjusted    Patient will continue to benefit from skilled therapy to address remaining functional deficits: Dysarthria    Progress towards goals / Updated goals:  Pt was observed to produce more varied intonation during conversational speech, increasing the overall naturalness of her speech. PLAN  [x]  Continue plan of care  []  Modify Goals/Treatment Plan      []  Discharge due to:  [] Other:    Goals for this certification period to be accomplished in 6 weeks:  1) Pt will perform oral motor exercises targeting improved lingual strength and ROM with no more than 2 cues from SLP for speed in order to improve tongue strength for speech production. 2) Pt will engage in CV motor speech drills with varying consonants with no more than 2 cues for speed and precision of production from SLP. 3) Pt will read sentence-level material within the range of 70-73 dB over 2 consecutive sessions in order to improve overall phonation/respiration coordination during lengthier utterances. GOAL DISCONTINUED  4) Pt will engage in a bingo game to encourage visual scanning of the board with no more than 2 cues from SLP in order to improve her ability to attend enjoyable bingo games in the community.  6/22/2018: Pt engaged in a [employer] game with 0 cues for visually scanning from left to right  [address] BESSY Merlos 6/26/2018  11:47 AM    Future Appointments  Date Time Provider Paul Lorenz   6/28/2018 10:00 AM Lydia Aguila PTA [facility]   6/28/2018 11:45 AM BESSY Jeffrey [facility]   7/2/2018 1:00 PM Lydia Aguila PTA [facility]   7/3/2018 11:00 AM BESSY Jeffrey [facility]   7/6/2018 8:00 AM Lydia Aguila PTA [facility]   7/6/2018 9:00 AM BESSY Jeffrey [facility]   7/11/2018 12:00 PM Lydia Aguila PTA [facility]   7/13/2018 10:30 AM BESSY Jeffrey [facility]   7/18/2018 11:45 AM BESSY Jeffrey [facility]   7/18/2018 12:30 PM Lydia Aguila PTA [facility]   7/20/2018 9:00 AM Lydia Aguila PTA [facility]   7/20/2018 9:45 AM BESSY Jeffrey [facility] 7/25/2018 1:00 PM Dima Gallo, PT, DPT [facility]   7/25/2018 2:00 PM Nelly Sanchez, SLP [facility]   7/27/2018 10:30 AM BESSY Olivarez [facility]

## 2018-06-26 NOTE — PROGRESS NOTES
PT DAILY TREATMENT NOTE - [facility]     Patient Name: Damien   Date:2018  : 1960  [x]  Patient  Verified  Payor: Pati Nic / Plan: [address] [place] [address] HMO / Product Type: Managed Care Medicare /    In time:11:07  Out time:11:45  Total Treatment Time (min): 38  Total Timed Codes (min): 38  1:1 Treatment Time ( [address] only): 45   Visit #: 24 of 36    Treatment Area: Nontraumatic intracerebral hemorrhage, unspecified [I61.9]  Other reduced mobility [Z74.09]    SUBJECTIVE  Pain Level (0-10 scale): 0/10  Any medication changes, allergies to medications, adverse drug reactions, diagnosis change, or new procedure performed?: [x] No    [] Yes (see summary sheet for update)  Subjective functional status/changes:   [] No changes reported  \"I don't have any pain today. \"    OBJECTIVE       28 min Therapeutic Exercise:  [x] See flow sheet :   Rationale: increase ROM, increase strength and improve coordination to improve the patients ability to perform ADLs with less pain. 10 min Therapeutic Activity:  [x]  See flow sheet :   Rationale: increase ROM, increase strength and improve coordination  to improve the patients ability to return to prior level of physical activity. With   [] TE   [] TA   [] neuro   [] other: Patient Education: [x] Review HEP    [] Progressed/Changed HEP based on:   [] positioning   [] body mechanics   [] transfers   [] heat/ice application    [] other:      Other Objective/Functional Measures:      Pain Level (0-10 scale) post treatment: 0/10    ASSESSMENT/Changes in Function: Pt continues to be challenged by therex. Pt reports increased fatigue post tx. Pt had no adverse affects post tx.      Patient will continue to benefit from skilled PT services to modify and progress therapeutic interventions, address functional mobility deficits, address ROM deficits, address strength deficits, analyze and address soft tissue restrictions, analyze and cue movement patterns, analyze and modify body mechanics/ergonomics and assess and modify postural abnormalities to attain remaining goals. []  See Plan of Care  []  See progress note/recertification  []  See Discharge Summary         Progress towards goals / Updated goals:  Long Term Goals: To be accomplished in 4 weeks:                        Patient will increase strength to 5/5 throughout B LEs to aid in return recreational activities and ADLs. (UPDATED)                        AXUPDU at IE: 3/5 throughout several planes in the R LE                        Current:4/5 in knee flex/ext, 3/5 in R hip flex progressing                            Patient will improve Tinetti score to 24 to demonstrate decreased risk for falls. ([place] Padron)                        VEFTSQ at IE:14                        Current: 25, Met 5/18/2018                            Patient will ambulate 1000ft on level surface with no AD. (UPDATED)                        QCCXTZ at IE: 51ft                        Current:Pt able to amb 1000ft on level surface without AD but shows decreased step length with L LE                            Patient will improve FOTO to 45 points overall to demonstrate improvement in functional ability.                         LMLZHP at IE 16                        Current:45/100 Met 4/24/2018        PLAN  [x]  Upgrade activities as tolerated     [x]  Continue plan of care  []  Update interventions per flow sheet       []  Discharge due to:_  []  Other:_      Jorge Ocasio PTA 6/26/2018  12:06 PM    Future Appointments  Date Time Provider Paul Lorenz   6/28/2018 10:00 AM Jorge Ocasio PTA [facility]   6/28/2018 11:45 AM BESSY Weems [facility]   7/2/2018 1:00 PM Jorge Ocasio PTA [facility]   7/3/2018 11:00 AM BESSY Weems [facility]   7/6/2018 8:00 AM Jorge Ocasio PTA [facility]   7/6/2018 9:00 AM BESSY Weems [facility]   7/11/2018 12:00 PM Jorge Ocasio PTA [facility]   7/13/2018 10:30 AM Kayode Pay Bernadette Henderson, [facility]   7/18/2018 11:45 AM BESSY Heck [facility]   7/18/2018 12:30 PM Hung Kathleen PTA [facility]   7/20/2018 9:00 AM Hung Kathleen [facility]   7/20/2018 9:45 AM BESSY Heck [facility]   7/25/2018 1:00 PM Michael Wolf, PT, DPT [facility]   7/25/2018 2:00 PM BESSY Heck [facility]   7/27/2018 10:30 AM BESSY Heck [facility]

## 2018-06-28 ENCOUNTER — APPOINTMENT (OUTPATIENT)
Dept: PHYSICAL THERAPY | Age: 58
End: 2018-06-28
Payer: MEDICARE

## 2018-06-28 ENCOUNTER — HOSPITAL ENCOUNTER (OUTPATIENT)
Dept: PHYSICAL THERAPY | Age: 58
Discharge: HOME | End: 2018-06-28
Payer: MEDICARE

## 2018-06-28 PROCEDURE — 92507 TX SP LANG VOICE COMM INDIV: CPT

## 2018-06-28 PROCEDURE — 97110 THERAPEUTIC EXERCISES: CPT

## 2018-06-28 PROCEDURE — 97530 THERAPEUTIC ACTIVITIES: CPT

## 2018-06-28 NOTE — PROGRESS NOTES
ST DAILY TREATMENT NOTE    Patient Name: Lacy Sousa  Date:2018  : 1960  [x]  Patient  Verified  Payor: Payor: Joey Carlisle / Plan: [address] [place] [address] HMO / Product Type: Managed Care Medicare /   In time:11:45  Out time:12:30  Total Treatment Time (min): 45  Visit #: 4 of 12    Treatment Diagnosis: Dysarthria [R47.1]    SUBJECTIVE  Pain Level (0-10 scale): 0  Any medication changes, allergies to medications, adverse drug reactions, diagnosis change, or new procedure performed?: [x] No    [] Yes (see summary sheet for update)    Subjective functional status/changes:   [x] No changes reported    OBJECTIVE  Treatment provided includes:  Increase/Improve:  []  Voice Quality []  Cognitive Linguistic Skills []  Laryngeal/Pharyngeal Exercises   []  Vocal Loudness []  Reading Comprehension []  Swallowing Skills    []  Vocal Cord Function []  Auditory Comprehension [x]  Oral Motor Skills   []  Resonance []  Writing Skills []  Compensatory strategies    []  Speech Intelligibility []  Expressive Language []  Attention   []  Breath Support/Coord.  []  Receptive language []  Memory   []  Articulation []  Safety Awareness []    []  Fluency []  Word Retrieval []        Treatment Provided:  Pt performed oral motor exercises targeting lingual range of motion and strength x15 with 0 cues     Patient/Caregiver  Education: [x] Review HEP      HEP/Handouts given: Continue HEP    Pain Level (0-10 scale) post treatment: 0    ASSESSMENT   [x]   Improving appropriately and progressing toward goals  []   Improving slowly and progressing toward goals  []   Approximating goals/maximum potential  [x]   Continues to benefit from skilled therapy to address remaining functional deficits  []   Not progressing toward goals and plan of care will be adjusted    Patient will continue to benefit from skilled therapy to address remaining functional deficits: Dysarthria    Progress towards goals / Updated goals:  Pt demonstrated improved range of movement and speed of movement during oral motor exercises this date. She reported that the right side of oral cavity feels stronger. PLAN  [x]  Continue plan of care  []  Modify Goals/Treatment Plan      []  Discharge due to:  [] Other:     Goals for this certification period to be accomplished in 6 weeks:  1) Pt will perform oral motor exercises targeting improved lingual strength and ROM with no more than 2 cues from SLP for speed in order to improve tongue strength for speech production. 6/28/2018: Pt performed oral motor exercises targeting lingual range of motion and strength x15 with 0 cues  2) Pt will engage in CV motor speech drills with varying consonants with no more than 2 cues for speed and precision of production from SLP. 6/26/2018: Pt engaged in CV motor speech drills with 0 cues; GOAL MET  3) Pt will read sentence-level material within the range of 70-73 dB over 2 consecutive sessions in order to improve overall phonation/respiration coordination during lengthier utterances.  GOAL DISCONTINUED  4) Pt will engage in a bingo game to encourage visual scanning of the board with no more than 2 cues from SLP in order to improve her ability to attend enjoyable bingo games in the community.       BESSY Ford 6/28/2018  11:41 AM    Future Appointments  Date Time Provider Paul Schultzi   6/28/2018 11:45 AM BESSY Ford [facility]   7/2/2018 1:00 PM Wes Kelly PTA [facility]   7/3/2018 11:00 AM BESSY Ford [facility]   7/6/2018 8:00 AM Wes Kelly PTA [facility]   7/6/2018 9:00 AM BESSY Ford [facility]   7/11/2018 12:00 PM Wes Kelly PTA [facility]   7/13/2018 10:30 AM BESSY Ford [facility]   7/18/2018 11:45 AM BESSY Ford [facility]   7/18/2018 12:30 PM Wes Kelly PTA [facility]   7/20/2018 9:00 AM Wes Kelly PTA [facility]   7/20/2018 9:45 AM BESSY Ford [facility]   7/25/2018 1:00 PM Rosalie Ma, PT, DPT [facility]   7/25/2018 2:00 PM Patricia Loaiza, SLP [facility]   7/27/2018 10:30 AM Patricia Loaiza, SLP [facility]

## 2018-06-28 NOTE — PROGRESS NOTES
PT DAILY TREATMENT NOTE - [facility]     Patient Name: Gypsy Ortega  Date:2018  : 1960  [x]  Patient  Verified  Payor: Annette Simmonds / Plan: [address] [place] [address] HMO / Product Type: Managed Care Medicare /    In time:10:06  Out time:10:45  Total Treatment Time (min): 39  Total Timed Codes (min): 39  1:1 Treatment Time ( [address] only): 24   Visit #: 25 of 39    Treatment Area: Nontraumatic intracerebral hemorrhage, unspecified [I61.9]  Other reduced mobility [Z74.09]    SUBJECTIVE  Pain Level (0-10 scale): 0/10  Any medication changes, allergies to medications, adverse drug reactions, diagnosis change, or new procedure performed?: [x] No    [] Yes (see summary sheet for update)  Subjective functional status/changes:   [] No changes reported  \"I don't have any pain today. \"    OBJECTIVE     28 min Therapeutic Exercise:  [x] See flow sheet :   Rationale: increase ROM, increase strength and improve coordination to improve the patients ability to return to prior level of physical activity. 11 min Therapeutic Activity:  [x]  See flow sheet :   Rationale: increase ROM, increase strength and improve coordination  to improve the patients ability to return to prior level of physical activity. With   [] TE   [] TA   [] neuro   [] other: Patient Education: [x] Review HEP    [] Progressed/Changed HEP based on:   [] positioning   [] body mechanics   [] transfers   [] heat/ice application    [] other:      Other Objective/Functional Measure:      Pain Level (0-10 scale) post treatment: 0/10    ASSESSMENT/Changes in Function: Pt progressing slowly with generally strength and stability. Pt tolerated ex well with no adverse affects post tx.      Patient will continue to benefit from skilled PT services to modify and progress therapeutic interventions, address functional mobility deficits, address ROM deficits, address strength deficits, analyze and address soft tissue restrictions, analyze and cue movement patterns, analyze and modify body mechanics/ergonomics and assess and modify postural abnormalities to attain remaining goals. []  See Plan of Care  []  See progress note/recertification  []  See Discharge Summary         Progress towards goals / Updated goals:  Long Term Goals: To be accomplished in 4 weeks:                        Patient will increase strength to 5/5 throughout B LEs to aid in return recreational activities and ADLs. (UPDATED)                        JSTYZR at IE: 3/5 throughout several planes in the R LE                        Current:4/5 in knee flex/ext, 3/5 in R hip flex progressing                            Patient will improve Tinetti score to 24 to demonstrate decreased risk for falls. ([facility])                        GUHFPC at IE:14                        Current: 25, Met 5/18/2018                            Patient will ambulate 1000ft on level surface with no AD. (UPDATED)                        GREONM at IE: 51ft                        Current:Pt able to amb 1000ft on level surface without AD but shows decreased step length with L LE                            Patient will improve FOTO to 45 points overall to demonstrate improvement in functional ability.                         MDPGAE at IE 16                        Current:45/100 Met 4/24/2018     PLAN  [x]  Upgrade activities as tolerated     [x]  Continue plan of care  []  Update interventions per flow sheet       []  Discharge due to:_  []  Other:_      Awais Maxwell PTA 6/28/2018  10:11 AM    Future Appointments  Date Time Provider Paul Lorenz   6/28/2018 11:45 AM BESSY Terry [facility]   7/2/2018 1:00 PM Awais Maxwell PTA [facility]   7/3/2018 11:00 AM BESSY Terry [facility]   7/6/2018 8:00 AM Awais Maxwell PTA [facility]   7/6/2018 9:00 AM BESSY Terry [facility]   7/11/2018 12:00 PM Awais Maxwell PTA [facility]   7/13/2018 10:30 AM BESSY Terry [facility] 7/18/2018 11:45 AM BESSY Ford [facility]   7/18/2018 12:30 PM Wes Kelly PTA [facility]   7/20/2018 9:00 AM Wes Kelly PTA [facility]   7/20/2018 9:45 AM BESSY Ford [facility]   7/25/2018 1:00 PM Antwon Tena, PT, DPT [facility]   7/25/2018 2:00 PM BESSY Ford [facility]   7/27/2018 10:30 AM BESSY Ford [facility]

## 2018-07-02 ENCOUNTER — HOSPITAL ENCOUNTER (OUTPATIENT)
Dept: PHYSICAL THERAPY | Age: 58
Discharge: HOME | End: 2018-07-02
Payer: MEDICARE

## 2018-07-02 PROCEDURE — 97110 THERAPEUTIC EXERCISES: CPT

## 2018-07-02 NOTE — PROGRESS NOTES
PT DAILY TREATMENT NOTE - [facility]     Patient Name: Darren Koenig  Date:2018  : 1960  [x]  Patient  Verified  Payor: Ana M Mcgovern / Plan: [address] [place] [address] HMO / Product Type: Managed Care Medicare /    In time:11:05  Out time:11:50  Total Treatment Time (min): 45  Total Timed Codes (min): 45  1:1 Treatment Time ( [address] only): 31   Visit #: 32 of 36    Treatment Area: Nontraumatic intracerebral hemorrhage, unspecified [I61.9]  Other reduced mobility [Z74.09]    SUBJECTIVE  Pain Level (0-10 scale): 0/10  Any medication changes, allergies to medications, adverse drug reactions, diagnosis change, or new procedure performed?: [x] No    [] Yes (see summary sheet for update)  Subjective functional status/changes:   [] No changes reported  \"I don't have any pain today. \"    OBJECTIVE      30 min Therapeutic Exercise:  [x] See flow sheet :   Rationale: increase ROM, increase strength and improve coordination to improve the patients ability to perform ADLs with less pain. 15 min Therapeutic Activity:  [x]  See flow sheet :   Rationale: increase ROM, increase strength and improve coordination  to improve the patients ability to return to prior level of physical activity. With   [] TE   [] TA   [] neuro   [] other: Patient Education: [x] Review HEP    [] Progressed/Changed HEP based on:   [] positioning   [] body mechanics   [] transfers   [] heat/ice application    [] other:      Other Objective/Functional Measures:      Pain Level (0-10 scale) post treatment: 0/10    ASSESSMENT/Changes in Function: Pt progressing slowly with general strength and tolerance to activity. Pt reports increased fatigue with therex requiring occasional rest breaks. Pt had no adverse affects post tx.      Patient will continue to benefit from skilled PT services to modify and progress therapeutic interventions, address functional mobility deficits, address ROM deficits, address strength deficits, analyze and address soft tissue restrictions, analyze and cue movement patterns, analyze and modify body mechanics/ergonomics and assess and modify postural abnormalities to attain remaining goals. []  See Plan of Care  []  See progress note/recertification  []  See Discharge Summary         Progress towards goals / Updated goals:  Long Term Goals: To be accomplished in 4 weeks:                        Patient will increase strength to 5/5 throughout B LEs to aid in return recreational activities and ADLs. (UPDATED)                        ECHGHM at IE: 3/5 throughout several planes in the R LE                        Current:4/5 in knee flex/ext, 3/5 in R hip flex progressing                            Patient will improve Tinetti score to 24 to demonstrate decreased risk for falls. (Oscar Montez)                        CXZNSV at IE:14                        Current: 25, Met 5/18/2018                            Patient will ambulate 1000ft on level surface with no AD. (UPDATED)                        FGGMNB at IE: 51ft                        Current:Pt able to amb 1000ft on level surface without AD but shows decreased step length with L LE                            Patient will improve FOTO to 45 points overall to demonstrate improvement in functional ability.                         OYBQCE at IE 16                        Current:45/100 Met 4/24/2018     PLAN  [x]  Upgrade activities as tolerated     [x]  Continue plan of care  []  Update interventions per flow sheet       []  Discharge due to:_  []  Other:_      Vesta Cerrato PTA 7/2/2018  11:30 AM    Future Appointments  Date Time Provider Paul Lorenz   7/3/2018 11:00 AM BESSY Cruz [facility]   7/6/2018 8:00 AM Vesta Cerrato PTA [facility]   7/6/2018 9:00 AM BESSY Cruz [facility]   7/11/2018 12:00 PM Vesta Cerrato PTA [facility]   7/13/2018 10:30 AM BESSY Cruz [facility]   7/18/2018 11:45 AM BESSY Cruz [facility]   7/18/2018 12:30 PM Rabia Bradshaw [facility]   7/20/2018 9:00 AM Bryant Valdez PTA [facility]   7/20/2018 9:45 AM BESSY Read [facility]   7/25/2018 1:00 PM Pedro Luis Sandra, PT, DPT [facility]   7/25/2018 2:00 PM BESSY Read [facility]

## 2018-07-03 ENCOUNTER — HOSPITAL ENCOUNTER (OUTPATIENT)
Dept: PHYSICAL THERAPY | Age: 58
Discharge: HOME | End: 2018-07-03
Payer: MEDICARE

## 2018-07-03 PROCEDURE — 92507 TX SP LANG VOICE COMM INDIV: CPT

## 2018-07-03 NOTE — PROGRESS NOTES
ST DAILY TREATMENT NOTE    Patient Name: Marilyn Anthony  Date:7/3/2018  : 1960  [x]  Patient  Verified  Payor: Payor: Priscilla Ba / Plan: [address] [place] [address] HMO / Product Type: Managed Care Medicare /   In time:11:00  Out time:11:45  Total Treatment Time (min): 45  Visit #: 5 of 12    Treatment Diagnosis: Dysarthria [R47.1]    SUBJECTIVE  Pain Level (0-10 scale): 0  Any medication changes, allergies to medications, adverse drug reactions, diagnosis change, or new procedure performed?: [x] No    [] Yes (see summary sheet for update)    Subjective functional status/changes:   [] No changes reported  Pt reported that she feels speech therapy has been very beneficial. She reported that she notices that her smile is more symmetrical.    OBJECTIVE  Treatment provided includes:  Increase/Improve:  []  Voice Quality []  Cognitive Linguistic Skills []  Laryngeal/Pharyngeal Exercises   []  Vocal Loudness []  Reading Comprehension []  Swallowing Skills    []  Vocal Cord Function []  Auditory Comprehension [x]  Oral Motor Skills   []  Resonance []  Writing Skills []  Compensatory strategies    [x]  Speech Intelligibility []  Expressive Language []  Attention   []  Breath Support/Coord.  []  Receptive language []  Memory   []  Articulation []  Safety Awareness []    []  Fluency []  Word Retrieval []        Treatment Provided:  Pt performed oral motor exercises targeting lingual and labial range of motion and strength x15 with 0 cues  Pt engaged in conversation with 0 cues for compensatory strategies for Dysarthria (slow rate, over-articulation)       Patient/Caregiver  Education: [x] Review HEP      HEP/Handouts given: Continue HEP    Pain Level (0-10 scale) post treatment: 0    ASSESSMENT   [x]   Improving appropriately and progressing toward goals  []   Improving slowly and progressing toward goals  []   Approximating goals/maximum potential  [x]   Continues to benefit from skilled therapy to address remaining functional deficits  []   Not progressing toward goals and plan of care will be adjusted    Patient will continue to benefit from skilled therapy to address remaining functional deficits: Dysarthria    Progress towards goals / Updated goals:  Pt demonstrated improved speech intelligibility during conversation with 0 cues. Pt has more naturalness to speech. PLAN  [x]  Continue plan of care  []  Modify Goals/Treatment Plan      []  Discharge due to:  [] Other:    Goals for this certification period to be accomplished in 6 weeks:  1) Pt will perform oral motor exercises targeting improved lingual strength and ROM with no more than 2 cues from SLP for speed in order to improve tongue strength for speech production. 7/3/2018: Pt performed oral motor exercises targeting lingual range of motion and strength x15 with 0 cues  2) Pt will engage in CV motor speech drills with varying consonants with no more than 2 cues for speed and precision of production from SLP. 6/26/2018: Pt engaged in CV motor speech drills with 0 cues; GOAL MET  3) Pt will read sentence-level material within the range of 70-73 dB over 2 consecutive sessions in order to improve overall phonation/respiration coordination during lengthier utterances.  GOAL DISCONTINUED  4) Pt will engage in a bingo game to encourage visual scanning of the board with no more than 2 cues from SLP in order to improve her ability to attend enjoyable bingo games in the community.       BESSY Sotelo 7/3/2018  11:04 AM    Future Appointments  Date Time Provider Paul Lorenz   7/6/2018 8:00 AM Breezy Hendricks [facility]   7/6/2018 9:00 AM BESSY Sotelo [facility]   7/11/2018 12:00 PM Breezy Hendricks [facility]   7/13/2018 10:30 AM BESSY Sotelo [facility]   7/18/2018 11:45 AM BESSY Sotelo [facility]   7/18/2018 12:30 PM Breezy Hendricks [facility]   7/20/2018 9:00 AM Breezy Hendricks [facility]   7/20/2018 9:45 AM BESSY Osorio [facility]   7/25/2018 1:00 PM Jem Jacobson, PT, DPT [facility]   7/25/2018 2:00 PM BESSY Osorio [facility]

## 2018-07-11 ENCOUNTER — APPOINTMENT (OUTPATIENT)
Dept: PHYSICAL THERAPY | Age: 58
End: 2018-07-11
Payer: MEDICARE

## 2018-07-11 ENCOUNTER — HOSPITAL ENCOUNTER (OUTPATIENT)
Dept: PHYSICAL THERAPY | Age: 58
Discharge: HOME | End: 2018-07-11
Payer: MEDICARE

## 2018-07-11 PROCEDURE — 97530 THERAPEUTIC ACTIVITIES: CPT

## 2018-07-11 PROCEDURE — 92507 TX SP LANG VOICE COMM INDIV: CPT

## 2018-07-11 PROCEDURE — 97110 THERAPEUTIC EXERCISES: CPT

## 2018-07-11 NOTE — PROGRESS NOTES
PT DAILY TREATMENT NOTE - [facility]     Patient Name: Darren Koenig  Date:2018  : 1960  [x]  Patient  Verified  Payor: Ana M Mcgovern / Plan: [address] [place] [address] HMO / Product Type: Managed Care Medicare /    In time:1:27  Out time:2:05  Total Treatment Time (min): 38  Total Timed Codes (min): 38  1:1 Treatment Time ( [address] only): 45   Visit #: 32 of 36    Treatment Area: Nontraumatic intracerebral hemorrhage, unspecified [I61.9]  Other reduced mobility [Z74.09]    SUBJECTIVE  Pain Level (0-10 scale): 0/10  Any medication changes, allergies to medications, adverse drug reactions, diagnosis change, or new procedure performed?: [x] No    [] Yes (see summary sheet for update)  Subjective functional status/changes:   [] No changes reported  \"I don't have any right now, it's more just sore. \"    OBJECTIVE        24 min Therapeutic Exercise:  [x] See flow sheet :   Rationale: increase ROM, increase strength and improve coordination to improve the patients ability to perform ADLs with less pain. 14 min Therapeutic Activity:  [x]  See flow sheet :   Rationale: increase ROM, increase strength and improve coordination  to improve the patients ability to perform ADLs with less pain. With   [] TE   [] TA   [] neuro   [] other: Patient Education: [x] Review HEP    [] Progressed/Changed HEP based on:   [] positioning   [] body mechanics   [] transfers   [] heat/ice application    [] other:      Other Objective/Functional Measures:      Pain Level (0-10 scale) post treatment: 0/10    ASSESSMENT/Changes in Function: Pt continues to be challenged by therex with no incresaed pain. Pt progressing with standing HHA support decreased to single UE support during step overs.      Patient will continue to benefit from skilled PT services to modify and progress therapeutic interventions, address functional mobility deficits, address ROM deficits, address strength deficits, analyze and address soft tissue restrictions, analyze and cue movement patterns, analyze and modify body mechanics/ergonomics and assess and modify postural abnormalities to attain remaining goals. []  See Plan of Care  []  See progress note/recertification  []  See Discharge Summary         Progress towards goals / Updated goals:  Long Term Goals: To be accomplished in 4 weeks:                        Patient will increase strength to 5/5 throughout B LEs to aid in return recreational activities and ADLs. (UPDATED)                        LVCPDP at IE: 3/5 throughout several planes in the R LE                        Current:4/5 in knee flex/ext, 3/5 in R hip flex progressing                            Patient will improve Tinetti score to 24 to demonstrate decreased risk for falls. (Giovana Hunt)                        CLFEJV at IE:14                        Current: 25, Met 5/18/2018                            Patient will ambulate 1000ft on level surface with no AD. (UPDATED)                        AFIXJN at IE: 51ft                        Current:Pt able to amb 1000ft on level surface without AD but shows decreased step length with L LE                            Patient will improve FOTO to 45 points overall to demonstrate improvement in functional ability.                         ZMOYCA at IE 16                        Current:45/100 Met 4/24/2018        PLAN  [x]  Upgrade activities as tolerated     [x]  Continue plan of care  []  Update interventions per flow sheet       []  Discharge due to:_  []  Other:_      Vladimir Recinos PTA 7/11/2018  2:59 PM    Future Appointments  Date Time Provider Paul Lorenz   7/13/2018 11:00 AM Maximiliano Marshall, PT, DPT [facility]   7/18/2018 11:45 AM BESSY Kimble [facility]   7/18/2018 12:30 PM Vladimir Recinos PTA [facility]   7/20/2018 9:00 AM Vladimir Recinos PTA [facility]   7/20/2018 9:45 AM BESSY Kimble [facility]   7/25/2018 1:00 PM Maximiliano Marshall, PT, DPT [facility]   7/25/2018 2:00 PM Kizzy Smith, SLP [facility]

## 2018-07-11 NOTE — PROGRESS NOTES
ST DAILY TREATMENT NOTE    Patient Name: Oanh Fitting  Date:2018  : 1960  [x]  Patient  Verified  Payor: Payor: Vikki Barrientos / Plan: [address] [place] [address] HMO / Product Type: Managed Care Medicare /   In time:12:30  Out time:1:15  Total Treatment Time (min): 45  Visit #: 6 of 12    Treatment Diagnosis: Dysarthria [R47.1]    SUBJECTIVE  Pain Level (0-10 scale): 0  Any medication changes, allergies to medications, adverse drug reactions, diagnosis change, or new procedure performed?: [x] No    [] Yes (see summary sheet for update)    Subjective functional status/changes:   [x] No changes reported      OBJECTIVE  Treatment provided includes:  Increase/Improve:  []  Voice Quality []  Cognitive Linguistic Skills []  Laryngeal/Pharyngeal Exercises   []  Vocal Loudness []  Reading Comprehension []  Swallowing Skills    []  Vocal Cord Function []  Auditory Comprehension []  Oral Motor Skills   []  Resonance []  Writing Skills []  Compensatory strategies    [x]  Speech Intelligibility []  Expressive Language []  Attention   []  Breath Support/Coord.  []  Receptive language []  Memory   []  Articulation []  Safety Awareness []    []  Fluency []  Word Retrieval []        Treatment Provided:  Pt engaged in 30-minute conversation with 0 cues for over-articulation and articulatory precision    Patient/Caregiver  Education: [x] Review HEP      HEP/Handouts given: Continue HEP    Pain Level (0-10 scale) post treatment: 0    ASSESSMENT   [x]   Improving appropriately and progressing toward goals  []   Improving slowly and progressing toward goals  []   Approximating goals/maximum potential  [x]   Continues to benefit from skilled therapy to address remaining functional deficits  []   Not progressing toward goals and plan of care will be adjusted    Patient will continue to benefit from skilled therapy to address remaining functional deficits: Dysarthria    Progress towards goals / Updated goals:  Pt is making excellent progress towards goal. Pt maintained lengthy conversation this session with improved breath support and articulatory precision. Prepare for discharge within the next 1-2 sessions. PLAN  []  Continue plan of care  []  Modify Goals/Treatment Plan      []  Discharge due to:0  [x] Other: Maintenance sessions (1-2    Goals for this certification period to be accomplished in 6 weeks:  1) Pt will perform oral motor exercises targeting improved lingual strength and ROM with no more than 2 cues from SLP for speed in order to improve tongue strength for speech production. 7/3/2018: Pt performed oral motor exercises targeting lingual range of motion and strength x15 with 0 cues  2) Pt will engage in CV motor speech drills with varying consonants with no more than 2 cues for speed and precision of production from SLP. 6/26/2018: Pt engaged in CV motor speech drills with 0 cues; GOAL MET  3) Pt will read sentence-level material within the range of 70-73 dB over 2 consecutive sessions in order to improve overall phonation/respiration coordination during lengthier utterances.  GOAL DISCONTINUED  4) Pt will engage in a bingo game to encourage visual scanning of the board with no more than 2 cues from SLP in order to improve her ability to attend enjoyable bingo games in the community.      BESSY Castillo 7/11/2018  12:29 PM    Future Appointments  Date Time Provider Paul Lorenz   7/11/2018 12:30 PM BESSY Castillo [facility]   7/11/2018 1:30 PM Maddy Ospina PTA [facility]   7/13/2018 11:00 AM Brenda Tavares PT, DPT [facility]   7/18/2018 11:45 AM BESSY Castillo [facility]   7/18/2018 12:30 PM Maddy Ospina PTA [facility]   7/20/2018 9:00 AM Maddy Ospina PTA [facility]   7/20/2018 9:45 AM BESSY Castillo [facility]   7/25/2018 1:00 PM Brenda Tavares PT, DPT [facility]   7/25/2018 2:00 PM Maribell Gallo, BESSY [facility]

## 2018-07-13 ENCOUNTER — APPOINTMENT (OUTPATIENT)
Dept: PHYSICAL THERAPY | Age: 58
End: 2018-07-13
Payer: MEDICARE

## 2018-07-18 ENCOUNTER — HOSPITAL ENCOUNTER (OUTPATIENT)
Dept: PHYSICAL THERAPY | Age: 58
Discharge: HOME | End: 2018-07-18
Payer: MEDICARE

## 2018-07-18 PROCEDURE — G9186 MOTOR SPEECH GOAL STATUS: HCPCS

## 2018-07-18 PROCEDURE — 97110 THERAPEUTIC EXERCISES: CPT

## 2018-07-18 PROCEDURE — 97530 THERAPEUTIC ACTIVITIES: CPT

## 2018-07-18 PROCEDURE — 92507 TX SP LANG VOICE COMM INDIV: CPT

## 2018-07-18 PROCEDURE — G9158 MOTOR SPEECH D/C STATUS: HCPCS

## 2018-07-18 NOTE — PROGRESS NOTES
[facility]  [address] [facility] [address]  Ph [phone]  Fx [phone]    Speech Therapy Discharge Summary    Patient name: Alisa Hall Start of Care: 3/9/2018   Referral source: Malachi Moreno MD : 1960   Medical/Treatment Diagnosis: Nontraumatic intracerebral hemorrhage, unspecified [I61.9]  Other reduced mobility [Z74.09] Onset Date:2017     Prior Hospitalization: see medical history Provider#: [id number]   Medications: Verified on Patient Summary List    Comorbidities: Depression, High Blood Pressure, Visual Impairements  Prior Level of Function:Independent prior to CVA    Visits from Start of Care: 20    Missed Visits: 4    Reporting Period : 2018 to 2018    Summary of Care:  Goal: Pt will perform oral motor exercises targeting improved lingual strength and ROM with no more than 2 cues from SLP for speed in order to improve tongue strength for speech production.   Status at evaluation: Weak, imprecise articulation caused by decreased lingual and labial strength  Status at last note/certification: Pt is requiring fading cues for maximum effort during exercises  Status at discharge: GOAL MET; No cues required for lingual exercises    Goal: Pt will engage in CV motor speech drills with varying consonants with no more than 2 cues for speed and precision of production from SLP. Status at last note/certification: 2 cues; Continue for consistency due to Pt's absence  Status at discharge: GOAL MET; No cues required for articulatory precision and speed    Goal: Pt will read sentence-level material within the range of 70-73 dB over 2 consecutive sessions in order to improve overall phonation/respiration coordination during lengthier utterances. THIS GOAL WAS DISCONTINUED. Pt AND Pt FAMILY REPORTED THAT Pt HAS ALWAYS BEEN SOFT-SPOKEN. SHE DEMONSTRATED IMPROVEMENTS IN HER OVERALL VOCAL STRENGTH.     Goal: Pt will engage in a [employer] game to encourage visual scanning of the board with no more than 2 cues from SLP in order to improve her ability to attend enjoyable bingo games in the community.   Status at last note/certification: 0 cues  Status at discharge: GOAL MET; 0 cues    ASSESSMENT: Pt has met all goals addressed in therapy. Pt exhibits improved vocal strength, intensity, and articulatory precision during short and lengthy conversations. Additionally, Pt reports that her daughters have noticed an improvement in her speech production. SLP provided Pt with a handout of exercises and compensatory strategies to use during fatigue to improve speech intelligibility. Told Pt to contact SLP if she notices any decline in speech, language, cognitive, or swallow function.     G Codes:     Motor:  Goal  CI= 1-19%   D/C  CI= 1-19%      The severity rating is based on the following outcomes:    National Outcomes Measures (NOMS), Pt progress, and Professional Judgement    RECOMMENDATIONS:  [x]Discontinue therapy: [x]Patient has reached or is progressing toward set goals      []Patient is non-compliant or has abdicated      []Due to lack of appreciable progress towards set goals    BESSY Castillo 7/18/2018 12:22 PM

## 2018-07-18 NOTE — PROGRESS NOTES
PT DAILY TREATMENT NOTE - [facility]     Patient Name: Silvio Potts  Date:2018  : 1960  [x]  Patient  Verified  Payor: Amy Colon / Plan: [address] [place] [address] HMO / Product Type: Managed Care Medicare /    In time:12:30  Out time:1:15  Total Treatment Time (min): 45  Total Timed Codes (min): 45  1:1 Treatment Time ( [address] only): 39   Visit #: 28 of 39    Treatment Area: Nontraumatic intracerebral hemorrhage, unspecified [I61.9]  Other reduced mobility [Z74.09]    SUBJECTIVE  Pain Level (0-10 scale): 0/10  Any medication changes, allergies to medications, adverse drug reactions, diagnosis change, or new procedure performed?: [x] No    [] Yes (see summary sheet for update)  Subjective functional status/changes:   [] No changes reported  \"I don't have any pain today. I'm ready to go. \"    OBJECTIVE      30 min Therapeutic Exercise:  [x] See flow sheet :   Rationale: increase ROM, increase strength and improve coordination to improve the patients ability to perform ADLs with less pain. 15 min Therapeutic Activity:  [x]  See flow sheet :   Rationale: increase ROM, increase strength and improve coordination  to improve the patients ability to perform ADLs with less pain. With   [] TE   [] TA   [] neuro   [] other: Patient Education: [x] Review HEP    [] Progressed/Changed HEP based on:   [] positioning   [] body mechanics   [] transfers   [] heat/ice application    [] other:      Other Objective/Functional Measures:      Pain Level (0-10 scale) post treatment: 0/10    ASSESSMENT/Changes in Function: Pt progressing well with tolerance to activity and general strengthening. Pt reports increased fatigue post tx but not above tolerance.      Patient will continue to benefit from skilled PT services to modify and progress therapeutic interventions, address functional mobility deficits, address ROM deficits, address strength deficits, analyze and address soft tissue restrictions, analyze and cue movement patterns, analyze and modify body mechanics/ergonomics and assess and modify postural abnormalities to attain remaining goals. []  See Plan of Care  []  See progress note/recertification  []  See Discharge Summary          Progress towards goals / Updated goals:  Long Term Goals: To be accomplished in 4 weeks:                        Patient will increase strength to 5/5 throughout B LEs to aid in return recreational activities and ADLs. (UPDATED)                        NGSXWH at IE: 3/5 throughout several planes in the R LE                        Current:4/5 in knee flex/ext, 3/5 in R hip flex progressing                            Patient will improve Tinetti score to 24 to demonstrate decreased risk for falls. ([facility])                        BMEXHQ at IE:14                        Current: 25, Met 5/18/2018                            Patient will ambulate 1000ft on level surface with no AD. (UPDATED)                        PRDGTN at IE: 51ft                        Current:Pt able to amb 1000ft on level surface without AD but shows decreased step length with L LE                            Patient will improve FOTO to 45 points overall to demonstrate improvement in functional ability.                         YWLKLX at IE 16                        Current:45/100 Met 4/24/2018     PLAN  [x]  Upgrade activities as tolerated     [x]  Continue plan of care  []  Update interventions per flow sheet       []  Discharge due to:_  []  Other:_      Hosie Oppenheim, PTA 7/18/2018  12:55 PM    Future Appointments  Date Time Provider Paul Lorenz   7/20/2018 9:00 AM Hosie Oppenheim, PTA [facility]   7/20/2018 9:45 AM BESSY Wilson [facility]   7/25/2018 1:00 PM Joselito Nj PT, DPT [facility]   7/25/2018 2:00 PM BESSY Wilson [facility]

## 2018-07-18 NOTE — PROGRESS NOTES
ST DAILY TREATMENT NOTE    Patient Name: Karen Roman  Date:2018  : 1960  [x]  Patient  Verified  Payor: Payor: Sarai Madrid / Plan: [address] [place] [address] HMO / Product Type: Managed Care Medicare /   In time:11:45  Out time:12:15  Total Treatment Time (min): 30  Visit #: 7 of 12    Treatment Diagnosis: Dysarthria [R47.1]    SUBJECTIVE  Pain Level (0-10 scale): 0  Any medication changes, allergies to medications, adverse drug reactions, diagnosis change, or new procedure performed?: [x] No    [] Yes (see summary sheet for update)    Subjective functional status/changes:   [] No changes reported  Pt reported that she feels much stronger. She reported that when she hears herself speak, her speech sounds much clearer and faster. OBJECTIVE  Treatment provided includes:  Increase/Improve:  []  Voice Quality []  Cognitive Linguistic Skills []  Laryngeal/Pharyngeal Exercises   []  Vocal Loudness []  Reading Comprehension []  Swallowing Skills    []  Vocal Cord Function []  Auditory Comprehension []  Oral Motor Skills   []  Resonance []  Writing Skills []  Compensatory strategies    []  Speech Intelligibility []  Expressive Language []  Attention   []  Breath Support/Coord.  []  Receptive language []  Memory   []  Articulation []  Safety Awareness []    []  Fluency []  Word Retrieval []        Treatment Provided:  Pt engaged in lengthy conversation with 100% intelligbility  Provided information regarding discharge    Patient/Caregiver  Education: [x] Review HEP      HEP/Handouts given: Continue exercises after discharge    Pain Level (0-10 scale) post treatment: 0    ASSESSMENT   [x]   Improving appropriately and progressing toward goals  []   Improving slowly and progressing toward goals  []   Approximating goals/maximum potential  []   Continues to benefit from skilled therapy to address remaining functional deficits  []   Not progressing toward goals and plan of care will be adjusted      Progress towards goals / Updated goals:  Pt has met all goals addressed in therapy. Pt exhibits improved vocal strength, intensity, and articulatory precision during short and lengthy conversations. Additionally, Pt reports that her daughters have noticed an improvement in her speech production. SLP provided Pt with a handout of exercises and compensatory strategies to use during fatigue to improve speech intelligibility. Told Pt to contact SLP if she notices any decline in speech, language, cognitive, or swallow function.     PLAN  []  Continue plan of care  []  Modify Goals/Treatment Plan      [x]  Discharge due to: Pt has met all goals  [] Other:    Sandre Frankel, SLP 7/18/2018  11:52 AM    Future Appointments  Date Time Provider Paul Lorenz   7/18/2018 12:30 PM Adam Yusuf PTA [facility]   7/20/2018 9:00 AM Adam Yusuf PTA [facility]   7/20/2018 9:45 AM Sandre Frankel, SLP [facility]   7/25/2018 1:00 PM Teodora Angeles, PT, DPT [facility]   7/25/2018 2:00 PM Sandre Frankel, SLP [facility]

## 2018-07-20 ENCOUNTER — APPOINTMENT (OUTPATIENT)
Dept: PHYSICAL THERAPY | Age: 58
End: 2018-07-20
Payer: MEDICARE

## 2018-07-25 ENCOUNTER — APPOINTMENT (OUTPATIENT)
Dept: PHYSICAL THERAPY | Age: 58
End: 2018-07-25
Payer: MEDICARE

## 2018-07-25 ENCOUNTER — HOSPITAL ENCOUNTER (OUTPATIENT)
Dept: PHYSICAL THERAPY | Age: 58
Discharge: HOME | End: 2018-07-25
Payer: MEDICARE

## 2018-07-25 PROCEDURE — 97110 THERAPEUTIC EXERCISES: CPT | Performed by: PHYSICAL THERAPIST

## 2018-07-25 PROCEDURE — G8979 MOBILITY GOAL STATUS: HCPCS | Performed by: PHYSICAL THERAPIST

## 2018-07-25 PROCEDURE — G8980 MOBILITY D/C STATUS: HCPCS | Performed by: PHYSICAL THERAPIST

## 2018-07-25 PROCEDURE — 97530 THERAPEUTIC ACTIVITIES: CPT | Performed by: PHYSICAL THERAPIST

## 2018-07-25 NOTE — PROGRESS NOTES
[facility]  [address], [address] [facility]  Ph [phone]  Fx [phone]    Physical Therapy Discharge Summary    Patient name: Lindy Lennox     Start of Care: 3/1/2018  Referral source: Piero Espinal MD    : 1960  Medical/Treatment Diagnosis: Nontraumatic intracerebral hemorrhage, unspecified [I61.9]  Other reduced mobility [Z74.09]  Onset Date:2018  Prior Hospitalization: see medical history   Provider#: [id number]  Comorbidities: hx CA in R eye (), blind in R eye, CVA 2017, HTN, A-fib  Prior Level of Function:indepedent w/ ADLs  Medications: Verified on Patient Summary List    Visits from Start of Care: 29    Missed Visits: 6  Reporting Period : 3/1/2018 to 2018    Summary of Care:  [address] [facility] be accomplished in 4 weeks:                        Patient will increase strength to 5/5 throughout B LEs to aid in return recreational activities and ADLs. (UPDATED)                        Status at IE: 3/5 throughout several planes in the R LE                        Current:4+/5 in knee flex/ext, 4/5 in R hip flex progressing, not PT                            Patient will improve Tinetti score to 24 to demonstrate decreased risk for falls. ([facility])                        ZJNDHC at IE:14                        Current: 25, Met 2018                            Patient will ambulate 1000ft on level surface with no AD. (UPDATED)                        NXPUTD at IE: 51ft                        Current:Pt able to amb 1000ft on level surface without AD but shows decreased step length with L LE, MET                            Patient will improve FOTO to 45 points overall to demonstrate improvement in functional ability.                         NVOEGK at IE 12                        Current:45/100 Met 2018   G-Codes (GP)  Mobility    Goal  CK= 40-59%  D/C  CK= 40-59%    The severity rating is based on clinical judgment and the FOTO score. ASSESSMENT/RECOMMENDATIONS:  Patient responds well to treatment session. Has met or reached significant progress in all areas. Is capable of being discharged to independent [facility] at this time. No adverse effects were noted from today's treatment session.     [x]Discontinue therapy: [x]Patient has reached or is progressing toward set goals      []Patient is non-compliant or has abdicated      []Due to lack of appreciable progress towards set goals    Danial Jaramillo PT, DPT 7/25/2018 1:59 PM

## 2018-07-25 NOTE — PROGRESS NOTES
PT DAILY TREATMENT NOTE - [facility]     Patient Name: Gypsy Ortega  Date:2018  : 1960  [x]  Patient  Verified  Payor: Annette Simmonds / Plan: [address] [place] [address] HMO / Product Type: Managed Care Medicare /    In time:12:53  Out time:1:31  Total Treatment Time (min): 38  Total Timed Codes (min): 38  1:1 Treatment Time ( [address] only): 45   Visit #: 34 of 29    Treatment Area: Nontraumatic intracerebral hemorrhage, unspecified [I61.9]  Other reduced mobility [Z74.09]    SUBJECTIVE  Pain Level (0-10 scale): 0  Any medication changes, allergies to medications, adverse drug reactions, diagnosis change, or new procedure performed?: [x] No    [] Yes (see summary sheet for update)  Subjective functional status/changes:   [] No changes reported  Feels good. Ready to be discharged if you all approve    OBJECTIVE    8 min Therapeutic Exercise:  [x] See flow sheet :   Rationale: increase ROM, increase strength and improve coordination to improve the patients ability to complete ADLs    30 min Therapeutic Activity:  [x]  See flow sheet :   Rationale: increase ROM, increase strength and improve coordination  to improve the patients ability to complete ADLs         With   [] TE   [] TA   [] neuro   [] other: Patient Education: [x] Review HEP    [] Progressed/Changed HEP based on:   [] positioning   [] body mechanics   [] transfers   [] heat/ice application    [] other:        Pain Level (0-10 scale) post treatment: 0    ASSESSMENT/Changes in Function: Patient responds well to treatment session. Has met or reached significant progress in all areas. Is capable of being discharged to independent [facility] at this time.  No adverse effects were noted from today's treatment session     []  See Plan of Care  []  See progress note/recertification  [x]  See Discharge Summary         Progress towards goals / Updated goals:  Long Term Goals: To be accomplished in 4 weeks:                        Patient will increase strength to 5/5 throughout B LEs to aid in return recreational activities and ADLs. (UPDATED)                        Status at IE: 3/5 throughout several planes in the R LE                        Current:4+/5 in knee flex/ext, 4/5 in R hip flex progressing, not PT                            Patient will improve Tinetti score to 24 to demonstrate decreased risk for falls. (Erika Galan)                        NOTRGG at IE:14                        Current: 25, Met 5/18/2018                            Patient will ambulate 1000ft on level surface with no AD. (UPDATED)                        ZDVKLJ at IE: 51ft                        Current:Pt able to amb 1000ft on level surface without AD but shows decreased step length with L LE, MET                            Patient will improve FOTO to 45 points overall to demonstrate improvement in functional ability.                       FPQMPM at IE 16                        Current:45/100 Met 4/24/2018     PLAN  []  Upgrade activities as tolerated     []  Continue plan of care  []  Update interventions per flow sheet       [x]  Discharge due to:_  []  Other:_      Rosalie Ma, PT, DPT 7/25/2018  1:00 PM    No future appointments.

## 2018-07-27 ENCOUNTER — APPOINTMENT (OUTPATIENT)
Dept: PHYSICAL THERAPY | Age: 58
End: 2018-07-27
Payer: MEDICARE

## 2020-07-16 ENCOUNTER — HOSPITAL ENCOUNTER (EMERGENCY)
Age: 60
Discharge: HOME | End: 2020-07-17
Attending: EMERGENCY MEDICINE
Payer: MEDICARE

## 2020-07-16 ENCOUNTER — APPOINTMENT (OUTPATIENT)
Dept: GENERAL RADIOLOGY | Age: 60
End: 2020-07-16
Attending: EMERGENCY MEDICINE
Payer: MEDICARE

## 2020-07-16 VITALS
TEMPERATURE: 98 F | OXYGEN SATURATION: 100 % | RESPIRATION RATE: 18 BRPM | HEART RATE: 79 BPM | DIASTOLIC BLOOD PRESSURE: 85 MMHG | WEIGHT: 220 LBS | BODY MASS INDEX: 32.58 KG/M2 | HEIGHT: 69 IN | SYSTOLIC BLOOD PRESSURE: 147 MMHG

## 2020-07-16 DIAGNOSIS — S69.92XA LEFT WRIST INJURY, INITIAL ENCOUNTER: Primary | ICD-10-CM

## 2020-07-16 DIAGNOSIS — S62.002A CLOSED NONDISPLACED FRACTURE OF SCAPHOID OF LEFT WRIST, UNSPECIFIED PORTION OF SCAPHOID, INITIAL ENCOUNTER: ICD-10-CM

## 2020-07-16 PROCEDURE — 75810000053 HC SPLINT APPLICATION

## 2020-07-16 PROCEDURE — 73110 X-RAY EXAM OF WRIST: CPT

## 2020-07-16 PROCEDURE — 99283 EMERGENCY DEPT VISIT LOW MDM: CPT

## 2020-07-17 ENCOUNTER — PATIENT OUTREACH (OUTPATIENT)
Dept: CASE MANAGEMENT | Age: 60
End: 2020-07-17

## 2020-07-17 PROCEDURE — 75810000053 HC SPLINT APPLICATION

## 2020-07-17 PROCEDURE — 74011250637 HC RX REV CODE- 250/637: Performed by: PHYSICIAN ASSISTANT

## 2020-07-17 RX ORDER — HYDROCODONE BITARTRATE AND ACETAMINOPHEN 5; 325 MG/1; MG/1
1 TABLET ORAL
Status: COMPLETED | OUTPATIENT
Start: 2020-07-17 | End: 2020-07-17

## 2020-07-17 RX ORDER — HYDROCODONE BITARTRATE AND ACETAMINOPHEN 5; 325 MG/1; MG/1
1 TABLET ORAL
Qty: 12 TAB | Refills: 0 | Status: SHIPPED | OUTPATIENT
Start: 2020-07-17 | End: 2020-07-20

## 2020-07-17 RX ADMIN — HYDROCODONE BITARTRATE AND ACETAMINOPHEN 1 TABLET: 5; 325 TABLET ORAL at 01:03

## 2020-07-17 NOTE — ED PROVIDER NOTES
EMERGENCY DEPARTMENT HISTORY AND PHYSICAL EXAM    Date: 7/16/2020  Patient Name: Alex Navas    History of Presenting Illness     Chief Complaint   Patient presents with    Wrist Pain         History Provided By: Patient    12:51 AM  Alex Navas is a 61 y.o. female with PMHX of stroke with right-sided deficit, hypertension who presents to the emergency department C/O left wrist pain status post fall prior to arrival.  Patient states she was walking up the stairs turn to do something and tripped falling trying to catch herself with outstretched arms. Since then has had pain to her left wrist. Pt denies extremity numbness or weakness, head injury, loss of consciousness, any other injuries and any other sxs or complaints. PCP: Damian Medley MD    Current Facility-Administered Medications   Medication Dose Route Frequency Provider Last Rate Last Dose    HYDROcodone-acetaminophen (NORCO) 5-325 mg per tablet 1 Tab  1 Tab Oral NOW LUNA Ayers         Current Outpatient Medications   Medication Sig Dispense Refill    HYDROcodone-acetaminophen (Norco) 5-325 mg per tablet Take 1 Tab by mouth every six (6) hours as needed for Pain for up to 3 days. Max Daily Amount: 4 Tabs. 12 Tab 0    atorvastatin (LIPITOR) 40 mg tablet Take  by mouth daily.  famotidine (PEPCID) 20 mg tablet Take 20 mg by mouth two (2) times a day.  furosemide (LASIX) 40 mg tablet Take  by mouth daily.  labetalol (NORMODYNE) 100 mg tablet Take  by mouth two (2) times a day.  losartan (COZAAR) 100 mg tablet Take 100 mg by mouth daily.  potassium chloride (KLOR-CON) 20 mEq packet Take 20 mEq by mouth two (2) times a day.  vortioxetine (TRINTELLIX) 10 mg tablet Take 10 mg by mouth daily.          Past History     Past Medical History:  Past Medical History:   Diagnosis Date    Cancer of eye ([facility] [address]     Hypertension     Stroke [facility])        Past Surgical History:  Past Surgical History:   Procedure Laterality Date    ABDOMEN SURGERY PROC UNLISTED      d&c    CARDIOVERSION, ELECTIVE  2016    HX GYN          HX HEENT      cancer    INGRID ECHO COLOR FLOW  2016       Family History:  History reviewed. No pertinent family history. Social History:  Social History     Tobacco Use    Smoking status: Never Smoker    Smokeless tobacco: Never Used   Substance Use Topics    Alcohol use: No    Drug use: No       Allergies:  No Known Allergies      Review of Systems   Review of Systems   Musculoskeletal: Positive for arthralgias and myalgias. Skin: Negative. Neurological: Negative for weakness and numbness. All other systems reviewed and are negative. Physical Exam     Vitals:    20 2302   BP: 147/85   Pulse: 79   Resp: 18   Temp: 98 °F (36.7 °C)   SpO2: 100%   Weight: 99.8 kg (220 lb)   Height: 5' 9\" (1.753 m)     Physical Exam  Vitals signs and nursing note reviewed. Constitutional:       General: She is not in acute distress. Appearance: Normal appearance. She is normal weight. HENT:      Head: Normocephalic and atraumatic. Musculoskeletal:      Left elbow: Normal.        Arms:       Left hand: Normal.   Skin:     General: Skin is warm and dry. Neurological:      General: No focal deficit present. Mental Status: She is alert and oriented to person, place, and time. Psychiatric:         Mood and Affect: Mood normal.         Behavior: Behavior normal.               Diagnostic Study Results     Labs -   No results found for this or any previous visit (from the past 12 hour(s)).     Radiologic Studies -   X-ray left wrist shows no acute process  Pending review by radiologist  XR WRIST LT AP/LAT/OBL MIN 3V    (Results Pending)     CT Results  (Last 48 hours)    None        CXR Results  (Last 48 hours)    None          Medications given in the ED-  Medications   HYDROcodone-acetaminophen (NORCO) 5-325 mg per tablet 1 Tab (has no administration in time range) Medical Decision Making   I am the first provider for this patient. I reviewed the vital signs, available nursing notes, past medical history, past surgical history, family history and social history. Vital Signs-Reviewed the patient's vital signs. Records Reviewed: Nursing Notes      Procedures:  Procedures    ED Course:  12:51 AM   Initial assessment performed. The patients presenting problems have been discussed, and they are in agreement with the care plan formulated and outlined with them. I have encouraged them to ask questions as they arise throughout their visit. Provider Notes (Medical Decision Making): Bruce Jesus is a 61 y.o. female presents with left wrist pain status post fall. Exam elicits significant tenderness but no deformity and neurovascular intact. X-ray showed no acute process. Concern for possible occult scaphoid fracture so placed in thumb spica splint, Norco as needed for pain and referral to orthopedics for definitive management    Diagnosis and Disposition       DISCHARGE NOTE:    Al Yo  results have been reviewed with her. She has been counseled regarding her diagnosis, treatment, and plan. She verbally conveys understanding and agreement of the signs, symptoms, diagnosis, treatment and prognosis and additionally agrees to follow up as discussed. She also agrees with the care-plan and conveys that all of her questions have been answered. I have also provided discharge instructions for her that include: educational information regarding their diagnosis and treatment, and list of reasons why they would want to return to the ED prior to their follow-up appointment, should her condition change. She has been provided with education for proper emergency department utilization. CLINICAL IMPRESSION:    1. Left wrist injury, initial encounter    2.  Closed nondisplaced fracture of scaphoid of left wrist, unspecified portion of scaphoid, initial encounter        PLAN:  1. D/C Home  2. Current Discharge Medication List      START taking these medications    Details   HYDROcodone-acetaminophen (Norco) 5-325 mg per tablet Take 1 Tab by mouth every six (6) hours as needed for Pain for up to 3 days. Max Daily Amount: 4 Tabs. Qty: 12 Tab, Refills: 0    Associated Diagnoses: Left wrist injury, initial encounter; Closed nondisplaced fracture of scaphoid of left wrist, unspecified portion of scaphoid, initial encounter           3. Follow-up Information     Follow up With Specialties Details Why Contact Info    Apoorva Javed MD Orthopedic Surgery Schedule an appointment as soon as possible for a visit  [address] [place] [address] Rd  [address] [facility] [address]  [phone]      [facility] EMERGENCY DEPT Emergency Medicine  As needed, If symptoms worsen [address] [address]  [phone]        _______________________________      Please note that this dictation was completed with [employer], the computer voice recognition software. Quite often unanticipated grammatical, syntax, homophones, and other interpretive errors are inadvertently transcribed by the computer software. Please disregard these errors. Please excuse any errors that have escaped final proofreading.

## 2020-07-17 NOTE — PROGRESS NOTES
Patient will call Dr. Annette Lutz for a follow up today. Patient is resting comfortably. Daughter picked up pain medication this morning and patient will take it after she eats. Patient contacted regarding recent discharge and COVID-19 risk. Discussed COVID-19 related testing which was not done at this time. Test results were not done. Patient informed of results, if available? no    Care Transition Nurse/ Ambulatory Care Manager contacted the patient by telephone to perform post discharge assessment. Verified name and  with patient as identifiers. Patient has following risk factors of: no known risk factors. CTN/ACM reviewed discharge instructions, medical action plan and red flags related to discharge diagnosis. Reviewed and educated them on any new and changed medications related to discharge diagnosis. Advised obtaining a 90-day supply of all daily and as-needed medications. Education provided regarding infection prevention, and signs and symptoms of COVID-19 and when to seek medical attention with patient who verbalized understanding. Discussed exposure protocols and quarantine from [address] you at higher risk for severe illness  and given an opportunity for questions and concerns. The patient agrees to contact the COVID-19 hotline [phone] or PCP office for questions related to their healthcare. CTN/ACM provided contact information for future reference. From CDC: Are you at higher risk for severe illness?  Wash your hands often.  Avoid close contact (6 feet, which is about two arm lengths) with people who are sick.  Put distance between yourself and other people if COVID-19 is spreading in your community.  Clean and disinfect frequently touched surfaces.  Avoid all cruise travel and non-essential air travel.  Call your healthcare professional if you have concerns about COVID-19 and your underlying condition or if you are sick.     For more information on steps you can take to protect yourself, see CDC's How to Protect Yourself      Patient/family/caregiver given information for [employer] and agrees to enroll no    Plan for follow-up call in 7-14 days based on severity of symptoms and risk factors.

## 2020-07-17 NOTE — DISCHARGE INSTRUCTIONS
Patient Education        Navicular (Scaphoid) Fracture of the Wrist: Care Instructions  Your Care Instructions  A navicular fracture (also called a scaphoid fracture) is a break in a small bone on the thumb side of your wrist. It can cause pain and swelling in the wrist and make it hard to move your wrist.  You may have broken this bone by putting your hand out to break a fall. Treatment for this type of break includes wearing an arm cast or splint and sometimes having surgery. The type of treatment depends on how bad the break is. Even if the first X-rays don't show a break, there may be one. If your doctor thinks a break is possible, he or she will treat it. It is better to do this than risk not treating a fracture and possibly delay healing. If the doctor treats the break, he or she may ask you to come back in 1 to 2 weeks for another X-ray. It is important to follow the doctor's instructions because parts of the navicular bone do not have a good blood supply. This can make healing slow and difficult. You heal best when you take good care of yourself. Eat a variety of healthy foods, and don't smoke. The doctor has checked you carefully, but problems can develop later. If you notice any problems or new symptoms, get medical treatment right away. Follow-up care is a key part of your treatment and safety. Be sure to make and go to all appointments, and call your doctor if you are having problems. It's also a good idea to know your test results and keep a list of the medicines you take. How can you care for yourself at home? · Be safe with medicines. Read and follow all instructions on the label. ? If the doctor gave you a prescription medicine for pain, take it as prescribed. ? If you are not taking a prescription pain medicine, ask your doctor if you can take an over-the-counter medicine. · Prop up your wrist on pillows when you sit or lie down in the first few days after the injury.  Keep your wrist higher than the level of your heart. This will help reduce swelling. · Put ice or a cold pack on your wrist for 10 to 20 minutes at a time. Try to do this every 1 to 2 hours for the next 3 days (when you are awake) or until the swelling goes down. Put a thin cloth between the ice and your skin. · Follow your doctor's directions for wearing a splint or cast.  When should you call for help? Call your doctor now or seek immediate medical care if:  · You have problems with your cast or splint. For example:  ? The skin under the cast or splint is burning or stinging. ? The cast or splint feels too tight. ? There is a lot of swelling near the cast or splint. (Some swelling is normal.)  ? You have a new fever. ? There is drainage or a bad smell coming from the cast or splint. · You have severe or increasing pain. · Your fingers turn cold or change color. · You have tingling, weakness, or numbness in your hand and fingers. Watch closely for changes in your health, and be sure to contact your doctor if:  · You do not get better as expected. Where can you learn more? Go to http://linda-stefanie.info/  Enter [id number] in the search box to learn more about \"Navicular (Scaphoid) Fracture of the Wrist: Care Instructions. \"  Current as of: March 2, 2020               Content Version: 12.5  © 0501-2841 [employer], [employer]. Care instructions adapted under license by [employer] (which disclaims liability or warranty for this information). If you have questions about a medical condition or this instruction, always ask your healthcare professional. [address] any warranty or liability for your use of this information.

## 2020-07-17 NOTE — ED TRIAGE NOTES
Patient reports ambulatory to ED with c/c of left wrist pain, onset 1 hour pta. Patient fell injuring left wrist. Obvious deformity noted.

## 2020-07-17 NOTE — ED NOTES
I have reviewed discharge instructions with the patient. The patient verbalized understanding. Patient armband removed and shredded. Pt in NAD at this time, no further needs expressed.   Pt has ride home

## 2023-05-12 RX ORDER — ATORVASTATIN CALCIUM 40 MG/1
TABLET, FILM COATED ORAL DAILY
COMMUNITY

## 2023-05-12 RX ORDER — FAMOTIDINE 20 MG/1
TABLET, FILM COATED ORAL 2 TIMES DAILY
COMMUNITY

## 2023-05-12 RX ORDER — LOSARTAN POTASSIUM 100 MG/1
100 TABLET ORAL DAILY
COMMUNITY

## 2023-05-12 RX ORDER — LABETALOL 100 MG/1
TABLET, FILM COATED ORAL 2 TIMES DAILY
COMMUNITY

## 2023-05-12 RX ORDER — POTASSIUM CHLORIDE 1.5 G/1.77G
POWDER, FOR SOLUTION ORAL 2 TIMES DAILY
COMMUNITY

## 2023-05-12 RX ORDER — FUROSEMIDE 40 MG/1
TABLET ORAL DAILY
COMMUNITY

## 2023-07-29 ENCOUNTER — APPOINTMENT (OUTPATIENT)
Facility: HOSPITAL | Age: 63
End: 2023-07-29
Payer: MEDICARE

## 2023-07-29 ENCOUNTER — HOSPITAL ENCOUNTER (EMERGENCY)
Facility: HOSPITAL | Age: 63
Discharge: HOME | End: 2023-07-30
Attending: EMERGENCY MEDICINE
Payer: MEDICARE

## 2023-07-29 DIAGNOSIS — D62 ANEMIA ASSOCIATED WITH ACUTE BLOOD LOSS: ICD-10-CM

## 2023-07-29 DIAGNOSIS — S37.019A PERINEPHRIC HEMATOMA: Primary | ICD-10-CM

## 2023-07-29 DIAGNOSIS — D17.71 ANGIOMYOLIPOMA OF KIDNEY: ICD-10-CM

## 2023-07-29 LAB
ALBUMIN SERPL-MCNC: 3.6 G/DL (ref 3.4–5)
ALBUMIN/GLOB SERPL: 1.1 (ref 0.8–1.7)
ALP SERPL-CCNC: 91 U/L (ref 45–117)
ALT SERPL-CCNC: 24 U/L (ref 13–56)
AMORPH CRY URNS QL MICRO: ABNORMAL
ANION GAP SERPL CALC-SCNC: 7 MMOL/L (ref 3–18)
APPEARANCE UR: ABNORMAL
AST SERPL-CCNC: 20 U/L (ref 10–38)
BACTERIA URNS QL MICRO: ABNORMAL /HPF
BASOPHILS # BLD: 0 K/UL (ref 0–0.1)
BASOPHILS NFR BLD: 1 % (ref 0–2)
BILIRUB SERPL-MCNC: 1 MG/DL (ref 0.2–1)
BILIRUB UR QL: NEGATIVE
BUN SERPL-MCNC: 14 MG/DL (ref 7–18)
BUN/CREAT SERPL: 16 (ref 12–20)
CALCIUM SERPL-MCNC: 9.3 MG/DL (ref 8.5–10.1)
CHLORIDE SERPL-SCNC: 109 MMOL/L (ref 100–111)
CO2 SERPL-SCNC: 25 MMOL/L (ref 21–32)
COLOR UR: YELLOW
CREAT SERPL-MCNC: 0.86 MG/DL (ref 0.6–1.3)
DIFFERENTIAL METHOD BLD: ABNORMAL
EKG ATRIAL RATE: 65 BPM
EKG DIAGNOSIS: NORMAL
EKG P AXIS: 74 DEGREES
EKG P-R INTERVAL: 158 MS
EKG Q-T INTERVAL: 446 MS
EKG QRS DURATION: 82 MS
EKG QTC CALCULATION (BAZETT): 463 MS
EKG R AXIS: -1 DEGREES
EKG T AXIS: 5 DEGREES
EKG VENTRICULAR RATE: 65 BPM
EOSINOPHIL # BLD: 0.1 K/UL (ref 0–0.4)
EOSINOPHIL NFR BLD: 1 % (ref 0–5)
EPITH CASTS URNS QL MICRO: ABNORMAL /LPF (ref 0–5)
ERYTHROCYTE [DISTWIDTH] IN BLOOD BY AUTOMATED COUNT: 11.1 % (ref 11.6–14.5)
GLOBULIN SER CALC-MCNC: 3.4 G/DL (ref 2–4)
GLUCOSE SERPL-MCNC: 135 MG/DL (ref 74–99)
GLUCOSE UR STRIP.AUTO-MCNC: NEGATIVE MG/DL
HCT VFR BLD AUTO: 34.1 % (ref 35–45)
HGB BLD-MCNC: 11.5 G/DL (ref 12–16)
HGB UR QL STRIP: NEGATIVE
IMM GRANULOCYTES # BLD AUTO: 0 K/UL (ref 0–0.04)
IMM GRANULOCYTES NFR BLD AUTO: 0 % (ref 0–0.5)
KETONES UR QL STRIP.AUTO: 15 MG/DL
LEUKOCYTE ESTERASE UR QL STRIP.AUTO: NEGATIVE
LIPASE SERPL-CCNC: 73 U/L (ref 73–393)
LYMPHOCYTES # BLD: 1.2 K/UL (ref 0.9–3.6)
LYMPHOCYTES NFR BLD: 14 % (ref 21–52)
MCH RBC QN AUTO: 32.3 PG (ref 24–34)
MCHC RBC AUTO-ENTMCNC: 33.7 G/DL (ref 31–37)
MCV RBC AUTO: 95.8 FL (ref 78–100)
MONOCYTES # BLD: 0.5 K/UL (ref 0.05–1.2)
MONOCYTES NFR BLD: 6 % (ref 3–10)
MUCOUS THREADS URNS QL MICRO: ABNORMAL /LPF
NEUTS SEG # BLD: 6.6 K/UL (ref 1.8–8)
NEUTS SEG NFR BLD: 78 % (ref 40–73)
NITRITE UR QL STRIP.AUTO: NEGATIVE
NRBC # BLD: 0 K/UL (ref 0–0.01)
NRBC BLD-RTO: 0 PER 100 WBC
PH UR STRIP: 8.5 (ref 5–8)
PLATELET # BLD AUTO: 230 K/UL (ref 135–420)
PMV BLD AUTO: 11 FL (ref 9.2–11.8)
POTASSIUM SERPL-SCNC: 3.5 MMOL/L (ref 3.5–5.5)
PROT SERPL-MCNC: 7 G/DL (ref 6.4–8.2)
PROT UR STRIP-MCNC: ABNORMAL MG/DL
RBC # BLD AUTO: 3.56 M/UL (ref 4.2–5.3)
RBC #/AREA URNS HPF: NEGATIVE /HPF (ref 0–5)
SODIUM SERPL-SCNC: 141 MMOL/L (ref 136–145)
SP GR UR REFRACTOMETRY: 1.02 (ref 1–1.03)
UROBILINOGEN UR QL STRIP.AUTO: 1 EU/DL (ref 0.2–1)
WBC # BLD AUTO: 8.5 K/UL (ref 4.6–13.2)
WBC URNS QL MICRO: ABNORMAL /HPF (ref 0–5)

## 2023-07-29 PROCEDURE — 99285 EMERGENCY DEPT VISIT HI MDM: CPT

## 2023-07-29 PROCEDURE — 74176 CT ABD & PELVIS W/O CONTRAST: CPT

## 2023-07-29 PROCEDURE — 2580000003 HC RX 258: Performed by: EMERGENCY MEDICINE

## 2023-07-29 PROCEDURE — 6360000002 HC RX W HCPCS: Performed by: EMERGENCY MEDICINE

## 2023-07-29 PROCEDURE — 96374 THER/PROPH/DIAG INJ IV PUSH: CPT

## 2023-07-29 PROCEDURE — 81001 URINALYSIS AUTO W/SCOPE: CPT

## 2023-07-29 PROCEDURE — 85025 COMPLETE CBC W/AUTO DIFF WBC: CPT

## 2023-07-29 PROCEDURE — 74174 CTA ABD&PLVS W/CONTRAST: CPT

## 2023-07-29 PROCEDURE — 83690 ASSAY OF LIPASE: CPT

## 2023-07-29 PROCEDURE — 80053 COMPREHEN METABOLIC PANEL: CPT

## 2023-07-29 PROCEDURE — 93005 ELECTROCARDIOGRAM TRACING: CPT | Performed by: EMERGENCY MEDICINE

## 2023-07-29 PROCEDURE — 96375 TX/PRO/DX INJ NEW DRUG ADDON: CPT

## 2023-07-29 PROCEDURE — 6360000004 HC RX CONTRAST MEDICATION: Performed by: EMERGENCY MEDICINE

## 2023-07-29 RX ORDER — ONDANSETRON 2 MG/ML
4 INJECTION INTRAMUSCULAR; INTRAVENOUS
Status: COMPLETED | OUTPATIENT
Start: 2023-07-29 | End: 2023-07-29

## 2023-07-29 RX ORDER — 0.9 % SODIUM CHLORIDE 0.9 %
1000 INTRAVENOUS SOLUTION INTRAVENOUS ONCE
Status: COMPLETED | OUTPATIENT
Start: 2023-07-29 | End: 2023-07-29

## 2023-07-29 RX ORDER — MORPHINE SULFATE 4 MG/ML
4 INJECTION, SOLUTION INTRAMUSCULAR; INTRAVENOUS
Status: COMPLETED | OUTPATIENT
Start: 2023-07-29 | End: 2023-07-29

## 2023-07-29 RX ORDER — HYDROMORPHONE HYDROCHLORIDE 1 MG/ML
1 INJECTION, SOLUTION INTRAMUSCULAR; INTRAVENOUS; SUBCUTANEOUS ONCE
Status: COMPLETED | OUTPATIENT
Start: 2023-07-30 | End: 2023-07-30

## 2023-07-29 RX ORDER — ONDANSETRON 2 MG/ML
4 INJECTION INTRAMUSCULAR; INTRAVENOUS
Status: COMPLETED | OUTPATIENT
Start: 2023-07-30 | End: 2023-07-30

## 2023-07-29 RX ORDER — DROPERIDOL 2.5 MG/ML
0.62 INJECTION, SOLUTION INTRAMUSCULAR; INTRAVENOUS ONCE
Status: COMPLETED | OUTPATIENT
Start: 2023-07-29 | End: 2023-07-29

## 2023-07-29 RX ADMIN — MORPHINE SULFATE 4 MG: 4 INJECTION, SOLUTION INTRAMUSCULAR; INTRAVENOUS at 17:39

## 2023-07-29 RX ADMIN — SODIUM CHLORIDE 1000 ML: 9 INJECTION, SOLUTION INTRAVENOUS at 17:39

## 2023-07-29 RX ADMIN — IOPAMIDOL 100 ML: 755 INJECTION, SOLUTION INTRAVENOUS at 21:08

## 2023-07-29 RX ADMIN — DROPERIDOL 0.62 MG: 2.5 INJECTION, SOLUTION INTRAMUSCULAR; INTRAVENOUS at 18:02

## 2023-07-29 RX ADMIN — ONDANSETRON 4 MG: 2 INJECTION INTRAMUSCULAR; INTRAVENOUS at 17:39

## 2023-07-29 ASSESSMENT — PAIN DESCRIPTION - LOCATION
LOCATION: FLANK
LOCATION: ABDOMEN;FLANK

## 2023-07-29 ASSESSMENT — PAIN DESCRIPTION - DESCRIPTORS: DESCRIPTORS: ACHING

## 2023-07-29 ASSESSMENT — PAIN DESCRIPTION - PAIN TYPE: TYPE: ACUTE PAIN

## 2023-07-29 ASSESSMENT — PAIN SCALES - GENERAL
PAINLEVEL_OUTOF10: 10
PAINLEVEL_OUTOF10: 10

## 2023-07-29 ASSESSMENT — PAIN DESCRIPTION - ORIENTATION: ORIENTATION: RIGHT

## 2023-07-29 ASSESSMENT — PAIN - FUNCTIONAL ASSESSMENT: PAIN_FUNCTIONAL_ASSESSMENT: 0-10

## 2023-07-29 ASSESSMENT — PAIN DESCRIPTION - ONSET: ONSET: SUDDEN

## 2023-07-29 NOTE — ED TRIAGE NOTES
Left sided flank pain starting suddenly around 1500, +nausea/vomiting. Last bm this morning.  Recent diagnosis of lung and breast cancer, not on chemo at this time

## 2023-07-29 NOTE — ED PROVIDER NOTES
[facility] EMERGENCY DEPT  EMERGENCY DEPARTMENT ENCOUNTER    Patient Name: Yomi Sepulveda  MRN: [id number]  YOB: 1960  Provider: Nayeli Voss MD  PCP: Francesco Elias MD   Time/Date of evaluation: 5:21 PM EDT on 23    History of Presenting Illness     Chief Complaint   Patient presents with    Flank Pain    Abdominal Pain       History Provided by: Patient and Patient's Daughter   History is limited by: Nothing    HISTORY (Narative):   Yomi Sepulveda is a 58 y.o. female with a PMHX of hypertension, CVA, lung cancer, breast cancer  who presents to the emergency department (room 1) by EMS C/O left flank pain onset 3:00 PM today. Associated sxs include nausea, vomiting. Pt denies any other sxs or complaints. Patient and family state recent MRI    Nursing Notes were all reviewed and agreed with or any disagreements were addressed in the HPI. Past History     PAST MEDICAL HISTORY:  Past Medical History:   Diagnosis Date    Cancer of eye ([address])     Hypertension     Stroke [facility])        PAST SURGICAL HISTORY:  Past Surgical History:   Procedure Laterality Date    CARDIOVERSION, ELECTIVE  2016    GYN          HEENT      cancer    ME ABDOMEN SURGERY PROC UNLISTED      d&c    ALLYSON ECHO COLOR FLOW  2016       FAMILY HISTORY:  No family history on file. SOCIAL HISTORY:  Social History     Tobacco Use    Smoking status: Never    Smokeless tobacco: Never   Substance Use Topics    Alcohol use: No    Drug use: No       MEDICATIONS:  No current facility-administered medications for this encounter.      Current Outpatient Medications   Medication Sig Dispense Refill    atorvastatin (LIPITOR) 40 MG tablet Take by mouth daily      famotidine (PEPCID) 20 MG tablet Take by mouth 2 times daily      furosemide (LASIX) 40 MG tablet Take by mouth daily      labetalol (NORMODYNE) 100 MG tablet Take by mouth 2 times daily      losartan (COZAAR) 100 MG tablet Take 1 tablet by mouth daily      potassium

## 2023-07-30 VITALS
SYSTOLIC BLOOD PRESSURE: 119 MMHG | TEMPERATURE: 98.2 F | WEIGHT: 215 LBS | HEART RATE: 66 BPM | HEIGHT: 69 IN | DIASTOLIC BLOOD PRESSURE: 72 MMHG | RESPIRATION RATE: 13 BRPM | BODY MASS INDEX: 31.84 KG/M2 | OXYGEN SATURATION: 100 %

## 2023-07-30 LAB
ABO + RH BLD: NORMAL
BLOOD GROUP ANTIBODIES SERPL: NORMAL
EKG ATRIAL RATE: 58 BPM
EKG DIAGNOSIS: NORMAL
EKG P AXIS: 59 DEGREES
EKG P-R INTERVAL: 154 MS
EKG Q-T INTERVAL: 480 MS
EKG QRS DURATION: 88 MS
EKG QTC CALCULATION (BAZETT): 471 MS
EKG R AXIS: 18 DEGREES
EKG T AXIS: 9 DEGREES
EKG VENTRICULAR RATE: 58 BPM
HCT VFR BLD AUTO: 26.9 % (ref 35–45)
HGB BLD-MCNC: 8.9 G/DL (ref 12–16)
SPECIMEN EXP DATE BLD: NORMAL

## 2023-07-30 PROCEDURE — 86901 BLOOD TYPING SEROLOGIC RH(D): CPT

## 2023-07-30 PROCEDURE — 2580000003 HC RX 258: Performed by: EMERGENCY MEDICINE

## 2023-07-30 PROCEDURE — 96376 TX/PRO/DX INJ SAME DRUG ADON: CPT

## 2023-07-30 PROCEDURE — 93005 ELECTROCARDIOGRAM TRACING: CPT | Performed by: EMERGENCY MEDICINE

## 2023-07-30 PROCEDURE — 86850 RBC ANTIBODY SCREEN: CPT

## 2023-07-30 PROCEDURE — 36415 COLL VENOUS BLD VENIPUNCTURE: CPT

## 2023-07-30 PROCEDURE — 85018 HEMOGLOBIN: CPT

## 2023-07-30 PROCEDURE — 96375 TX/PRO/DX INJ NEW DRUG ADDON: CPT

## 2023-07-30 PROCEDURE — 86900 BLOOD TYPING SEROLOGIC ABO: CPT

## 2023-07-30 PROCEDURE — 6360000002 HC RX W HCPCS: Performed by: EMERGENCY MEDICINE

## 2023-07-30 PROCEDURE — 85014 HEMATOCRIT: CPT

## 2023-07-30 RX ORDER — HYDROMORPHONE HYDROCHLORIDE 1 MG/ML
1 INJECTION, SOLUTION INTRAMUSCULAR; INTRAVENOUS; SUBCUTANEOUS
Status: COMPLETED | OUTPATIENT
Start: 2023-07-30 | End: 2023-07-30

## 2023-07-30 RX ORDER — 0.9 % SODIUM CHLORIDE 0.9 %
500 INTRAVENOUS SOLUTION INTRAVENOUS ONCE
Status: COMPLETED | OUTPATIENT
Start: 2023-07-30 | End: 2023-07-30

## 2023-07-30 RX ORDER — ONDANSETRON 2 MG/ML
4 INJECTION INTRAMUSCULAR; INTRAVENOUS
Status: COMPLETED | OUTPATIENT
Start: 2023-07-30 | End: 2023-07-30

## 2023-07-30 RX ADMIN — ONDANSETRON HYDROCHLORIDE 4 MG: 2 INJECTION INTRAMUSCULAR; INTRAVENOUS at 00:16

## 2023-07-30 RX ADMIN — SODIUM CHLORIDE 500 ML: 9 INJECTION, SOLUTION INTRAVENOUS at 07:11

## 2023-07-30 RX ADMIN — HYDROMORPHONE HYDROCHLORIDE 1 MG: 1 INJECTION, SOLUTION INTRAMUSCULAR; INTRAVENOUS; SUBCUTANEOUS at 00:15

## 2023-07-30 RX ADMIN — ONDANSETRON 4 MG: 2 INJECTION INTRAMUSCULAR; INTRAVENOUS at 10:16

## 2023-07-30 RX ADMIN — ONDANSETRON 4 MG: 2 INJECTION INTRAMUSCULAR; INTRAVENOUS at 07:11

## 2023-07-30 RX ADMIN — HYDROMORPHONE HYDROCHLORIDE 1 MG: 1 INJECTION, SOLUTION INTRAMUSCULAR; INTRAVENOUS; SUBCUTANEOUS at 10:16

## 2023-07-30 ASSESSMENT — PAIN DESCRIPTION - LOCATION: LOCATION: ABDOMEN

## 2023-07-30 ASSESSMENT — PAIN SCALES - GENERAL: PAINLEVEL_OUTOF10: 8

## 2023-07-30 ASSESSMENT — PAIN DESCRIPTION - ORIENTATION: ORIENTATION: LEFT

## 2023-07-30 NOTE — ED NOTES
2L O2 via nasal cannula applied for patient comfort after medication administration and potential associated drowsiness.       Jose Mahmood RN  07/29/23 3123
Bedside and Verbal shift change report given to Debbie Tenorio RN (oncoming nurse) by [facility], RN (offgoing nurse). Report included the following information Nurse Handoff Report, Index, ED Encounter Summary, MAR, and Recent Results.        Jaspal Pang RN  07/30/23 9677
Family at bedside     Francisca Rowe, [address]  07/29/23 7356
Patient brought in by EMS for left sided flank pain. Patient is non-cooperative with complete assessment due to pain at this time. Patient able to say her left side hurts, but no further description at this time. Patient is rolling all over the bed, stating \"no more Xrays\" on repeat, will not elaborate on what she means by this. Airway is patent, on room air, assessment limited to patient behavior.       Jamil Alcazar RN  07/29/23 6598
Pt in bed resting comfortably. Family in room. Pt and family updated on transport. At this time, transport unavailable until noon tomorrow. Pt states she understands. Pt will be updated if time changes.       Yanely Sigala RN  07/30/23 4120
Report given to [address] [place] [address] [place], RN  07/29/23 4814
result                           Contains abnormal data Comprehensive Metabolic Panel (Final result)   Component (Lab Inquiry)  Collection Time Result Time NA K CL CO2 ANION GAP GLUCOSE BUN Creatinine Bun/Cre Ratio EGFR   07/29/23 17:00:00 07/29/23 18:29:05 141 3.5 109 25 7 135 High  14 0.86 16 >60       Pediatric calculato. .. Collection Time Result Time CALCIUM BILI TOTAL ALT AST Alk Phosphatase PROTEIN Albumin Globulin Albumin/Globulin Ratio   07/29/23 17:00:00 07/29/23 18:29:05 9.3 1.0 24 20 91 7.0 3.6 3.4 1.1         Final result                          Lipase (Final result)   Component (Lab Inquiry)  Collection Time Result Time LIPASE   07/29/23 17:00:00 07/29/23 18:19:02 73         Final result               RADIOLOGIC STUDIES:   Non x-ray images such as CT, Ultrasound and MRI are read by the radiologist. X-ray images are visualized and preliminarily interpreted by the ED Provider with the findings as listed in the ED Course section below. 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 There is no evidence of active extravasation. Hemorrhage   is not significantly changed in the interval since the previous exam.    Numerous hepatic hypodensities the majority of which are simple cysts. Incidental and/or nonemergent findings are as described in detail above. CT ABDOMEN PELVIS WO CONTRAST Additional Contrast? 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 Numerous hepatic hypodensities the majority of which are simple cysts. Not all   are fully characterize. Nonemergent contrast-enhanced CT scan of the abdomen for   further delineation recommended.     Imaging findings called to Dr. Herminio Barton   Incidental and/or
note that this dictation was completed with [employer], the computer voice recognition software. Quite often unanticipated grammatical, syntax, homophones, and other interpretive errors are inadvertently transcribed by the computer software. Please disregard these errors. Please excuse any errors that have escaped final proofreading.     Shade Kahn MD  (Electronically signed)           Ozzy Randall MD  07/30/23 8242